# Patient Record
Sex: FEMALE | Race: WHITE | NOT HISPANIC OR LATINO | Employment: UNEMPLOYED | URBAN - METROPOLITAN AREA
[De-identification: names, ages, dates, MRNs, and addresses within clinical notes are randomized per-mention and may not be internally consistent; named-entity substitution may affect disease eponyms.]

---

## 2017-01-10 ENCOUNTER — TRANSCRIBE ORDERS (OUTPATIENT)
Dept: ADMINISTRATIVE | Facility: HOSPITAL | Age: 32
End: 2017-01-10

## 2017-01-10 ENCOUNTER — LAB (OUTPATIENT)
Dept: LAB | Facility: HOSPITAL | Age: 32
End: 2017-01-10

## 2017-01-10 DIAGNOSIS — Z00.00 ROUTINE GENERAL MEDICAL EXAMINATION AT A HEALTH CARE FACILITY: Primary | ICD-10-CM

## 2017-01-10 DIAGNOSIS — Z00.00 ROUTINE GENERAL MEDICAL EXAMINATION AT A HEALTH CARE FACILITY: ICD-10-CM

## 2017-01-10 LAB
ALBUMIN SERPL-MCNC: 4.1 G/DL (ref 3.5–5.2)
ALBUMIN/GLOB SERPL: 1.4 G/DL
ALP SERPL-CCNC: 30 U/L (ref 39–117)
ALT SERPL W P-5'-P-CCNC: 11 U/L (ref 1–33)
ANION GAP SERPL CALCULATED.3IONS-SCNC: 12.1 MMOL/L
AST SERPL-CCNC: 15 U/L (ref 1–32)
BASOPHILS # BLD AUTO: 0.04 10*3/MM3 (ref 0–0.2)
BASOPHILS NFR BLD AUTO: 0.5 % (ref 0–1.5)
BILIRUB SERPL-MCNC: 0.2 MG/DL (ref 0.1–1.2)
BUN BLD-MCNC: 15 MG/DL (ref 6–20)
BUN/CREAT SERPL: 25 (ref 7–25)
CALCIUM SPEC-SCNC: 9.5 MG/DL (ref 8.6–10.5)
CHLORIDE SERPL-SCNC: 101 MMOL/L (ref 98–107)
CO2 SERPL-SCNC: 24.9 MMOL/L (ref 22–29)
CREAT BLD-MCNC: 0.6 MG/DL (ref 0.57–1)
DEPRECATED RDW RBC AUTO: 40.9 FL (ref 37–54)
EOSINOPHIL # BLD AUTO: 0.18 10*3/MM3 (ref 0–0.7)
EOSINOPHIL NFR BLD AUTO: 2.2 % (ref 0.3–6.2)
ERYTHROCYTE [DISTWIDTH] IN BLOOD BY AUTOMATED COUNT: 11.9 % (ref 11.7–13)
FERRITIN SERPL-MCNC: 163.1 NG/ML (ref 13–150)
FOLATE SERPL-MCNC: 14.91 NG/ML (ref 4.78–24.2)
GFR SERPL CREATININE-BSD FRML MDRD: 117 ML/MIN/1.73
GLOBULIN UR ELPH-MCNC: 2.9 GM/DL
GLUCOSE BLD-MCNC: 85 MG/DL (ref 65–99)
HCT VFR BLD AUTO: 40.6 % (ref 35.6–45.5)
HGB BLD-MCNC: 13.2 G/DL (ref 11.9–15.5)
IMM GRANULOCYTES # BLD: 0.02 10*3/MM3 (ref 0–0.03)
IMM GRANULOCYTES NFR BLD: 0.2 % (ref 0–0.5)
IRON 24H UR-MRATE: 50 MCG/DL (ref 37–145)
LYMPHOCYTES # BLD AUTO: 3.11 10*3/MM3 (ref 0.9–4.8)
LYMPHOCYTES NFR BLD AUTO: 37.8 % (ref 19.6–45.3)
MCH RBC QN AUTO: 30.9 PG (ref 26.9–32)
MCHC RBC AUTO-ENTMCNC: 32.5 G/DL (ref 32.4–36.3)
MCV RBC AUTO: 95.1 FL (ref 80.5–98.2)
MONOCYTES # BLD AUTO: 0.37 10*3/MM3 (ref 0.2–1.2)
MONOCYTES NFR BLD AUTO: 4.5 % (ref 5–12)
NEUTROPHILS # BLD AUTO: 4.5 10*3/MM3 (ref 1.9–8.1)
NEUTROPHILS NFR BLD AUTO: 54.8 % (ref 42.7–76)
NRBC BLD MANUAL-RTO: 0 /100 WBC (ref 0–0)
PLATELET # BLD AUTO: 288 10*3/MM3 (ref 140–500)
PMV BLD AUTO: 10.5 FL (ref 6–12)
POTASSIUM BLD-SCNC: 3.9 MMOL/L (ref 3.5–5.2)
PROT SERPL-MCNC: 7 G/DL (ref 6–8.5)
RBC # BLD AUTO: 4.27 10*6/MM3 (ref 3.9–5.2)
SODIUM BLD-SCNC: 138 MMOL/L (ref 136–145)
T4 FREE SERPL-MCNC: 1.06 NG/DL (ref 0.93–1.7)
T4 SERPL-MCNC: 7.69 MCG/DL (ref 4.5–11.7)
TSH SERPL DL<=0.05 MIU/L-ACNC: 2.58 MIU/ML (ref 0.27–4.2)
VIT B12 BLD-MCNC: 876 PG/ML (ref 211–946)
WBC NRBC COR # BLD: 8.22 10*3/MM3 (ref 4.5–10.7)

## 2017-01-10 PROCEDURE — 82728 ASSAY OF FERRITIN: CPT | Performed by: INTERNAL MEDICINE

## 2017-01-10 PROCEDURE — 82746 ASSAY OF FOLIC ACID SERUM: CPT | Performed by: INTERNAL MEDICINE

## 2017-01-10 PROCEDURE — 82607 VITAMIN B-12: CPT | Performed by: INTERNAL MEDICINE

## 2017-01-10 PROCEDURE — 84436 ASSAY OF TOTAL THYROXINE: CPT | Performed by: INTERNAL MEDICINE

## 2017-01-10 PROCEDURE — 83540 ASSAY OF IRON: CPT | Performed by: INTERNAL MEDICINE

## 2017-01-10 PROCEDURE — 36415 COLL VENOUS BLD VENIPUNCTURE: CPT

## 2017-01-10 PROCEDURE — 84443 ASSAY THYROID STIM HORMONE: CPT | Performed by: INTERNAL MEDICINE

## 2017-01-10 PROCEDURE — 80053 COMPREHEN METABOLIC PANEL: CPT | Performed by: INTERNAL MEDICINE

## 2017-01-10 PROCEDURE — 85025 COMPLETE CBC W/AUTO DIFF WBC: CPT | Performed by: INTERNAL MEDICINE

## 2017-01-10 PROCEDURE — 84439 ASSAY OF FREE THYROXINE: CPT | Performed by: INTERNAL MEDICINE

## 2018-04-27 LAB
EXTERNAL ABO GROUPING: NORMAL
EXTERNAL ANTIBODY SCREEN: NEGATIVE
EXTERNAL GROUP B STREP ANTIGEN: NORMAL
EXTERNAL HEPATITIS B SURFACE ANTIGEN: NEGATIVE
EXTERNAL RH FACTOR: POSITIVE
EXTERNAL RUBELLA QUALITATIVE: NORMAL
EXTERNAL SYPHILIS RPR SCREEN: NORMAL
HIV1 P24 AG SERPL QL IA: NEGATIVE

## 2018-12-08 ENCOUNTER — HOSPITAL ENCOUNTER (INPATIENT)
Facility: HOSPITAL | Age: 33
LOS: 5 days | Discharge: HOME | End: 2018-12-13
Attending: OBSTETRICS & GYNECOLOGY | Admitting: OBSTETRICS & GYNECOLOGY

## 2018-12-08 ENCOUNTER — ANESTHESIA EVENT (OUTPATIENT)
Dept: LABOR AND DELIVERY | Facility: HOSPITAL | Age: 33
End: 2018-12-08

## 2018-12-08 ENCOUNTER — ANESTHESIA (OUTPATIENT)
Dept: LABOR AND DELIVERY | Facility: HOSPITAL | Age: 33
End: 2018-12-08

## 2018-12-08 ENCOUNTER — HOSPITAL ENCOUNTER (OUTPATIENT)
Dept: LABOR AND DELIVERY | Facility: HOSPITAL | Age: 33
Discharge: HOME | End: 2018-12-08

## 2018-12-08 PROBLEM — Z34.90 PREGNANCY: Status: ACTIVE | Noted: 2018-12-08

## 2018-12-08 LAB
ABO GROUP BLD: NORMAL
BASOPHILS # BLD AUTO: 0.03 10*3/MM3 (ref 0–0.2)
BASOPHILS NFR BLD AUTO: 0.3 % (ref 0–1.5)
BLD GP AB SCN SERPL QL: NEGATIVE
DEPRECATED RDW RBC AUTO: 45 FL (ref 37–54)
EOSINOPHIL # BLD AUTO: 0.25 10*3/MM3 (ref 0–0.7)
EOSINOPHIL NFR BLD AUTO: 2.1 % (ref 0.3–6.2)
ERYTHROCYTE [DISTWIDTH] IN BLOOD BY AUTOMATED COUNT: 12.5 % (ref 11.7–13)
EXPIRATION DATE: NORMAL
HCT VFR BLD AUTO: 43.2 % (ref 35.6–45.5)
HGB BLD-MCNC: 13.8 G/DL (ref 11.9–15.5)
IMM GRANULOCYTES # BLD: 0.04 10*3/MM3 (ref 0–0.03)
IMM GRANULOCYTES NFR BLD: 0.3 % (ref 0–0.5)
LYMPHOCYTES # BLD AUTO: 2.39 10*3/MM3 (ref 0.9–4.8)
LYMPHOCYTES NFR BLD AUTO: 20 % (ref 19.6–45.3)
Lab: NORMAL
MCH RBC QN AUTO: 31.5 PG (ref 26.9–32)
MCHC RBC AUTO-ENTMCNC: 31.9 G/DL (ref 32.4–36.3)
MCV RBC AUTO: 98.6 FL (ref 80.5–98.2)
MONOCYTES # BLD AUTO: 0.78 10*3/MM3 (ref 0.2–1.2)
MONOCYTES NFR BLD AUTO: 6.5 % (ref 5–12)
NEUTROPHILS # BLD AUTO: 8.46 10*3/MM3 (ref 1.9–8.1)
NEUTROPHILS NFR BLD AUTO: 70.8 % (ref 42.7–76)
PLATELET # BLD AUTO: 228 10*3/MM3 (ref 140–500)
PMV BLD AUTO: 11.1 FL (ref 6–12)
PROT UR STRIP-MCNC: NEGATIVE MG/DL
RBC # BLD AUTO: 4.38 10*6/MM3 (ref 3.9–5.2)
RH BLD: POSITIVE
T&S EXPIRATION DATE: NORMAL
WBC NRBC COR # BLD: 11.95 10*3/MM3 (ref 4.5–10.7)

## 2018-12-08 PROCEDURE — 86901 BLOOD TYPING SEROLOGIC RH(D): CPT | Performed by: OBSTETRICS & GYNECOLOGY

## 2018-12-08 PROCEDURE — 25010000002 ONDANSETRON PER 1 MG: Performed by: OBSTETRICS & GYNECOLOGY

## 2018-12-08 PROCEDURE — 86850 RBC ANTIBODY SCREEN: CPT | Performed by: OBSTETRICS & GYNECOLOGY

## 2018-12-08 PROCEDURE — C1755 CATHETER, INTRASPINAL: HCPCS | Performed by: ANESTHESIOLOGY

## 2018-12-08 PROCEDURE — 85025 COMPLETE CBC W/AUTO DIFF WBC: CPT | Performed by: OBSTETRICS & GYNECOLOGY

## 2018-12-08 PROCEDURE — 86900 BLOOD TYPING SEROLOGIC ABO: CPT | Performed by: OBSTETRICS & GYNECOLOGY

## 2018-12-08 PROCEDURE — 3E033VJ INTRODUCTION OF OTHER HORMONE INTO PERIPHERAL VEIN, PERCUTANEOUS APPROACH: ICD-10-PCS | Performed by: OBSTETRICS & GYNECOLOGY

## 2018-12-08 PROCEDURE — 81002 URINALYSIS NONAUTO W/O SCOPE: CPT | Performed by: OBSTETRICS & GYNECOLOGY

## 2018-12-08 RX ORDER — SODIUM CHLORIDE 0.9 % (FLUSH) 0.9 %
3-10 SYRINGE (ML) INJECTION AS NEEDED
Status: DISCONTINUED | OUTPATIENT
Start: 2018-12-08 | End: 2018-12-09 | Stop reason: HOSPADM

## 2018-12-08 RX ORDER — OXYTOCIN-SODIUM CHLORIDE 0.9% IV SOLN 30 UNIT/500ML 30-0.9/5 UT/ML-%
2 SOLUTION INTRAVENOUS
Status: DISCONTINUED | OUTPATIENT
Start: 2018-12-08 | End: 2018-12-09 | Stop reason: HOSPADM

## 2018-12-08 RX ORDER — MINERAL OIL
OIL (ML) MISCELLANEOUS ONCE
Status: DISCONTINUED | OUTPATIENT
Start: 2018-12-08 | End: 2018-12-09 | Stop reason: HOSPADM

## 2018-12-08 RX ORDER — SODIUM CHLORIDE 0.9 % (FLUSH) 0.9 %
3 SYRINGE (ML) INJECTION EVERY 12 HOURS SCHEDULED
Status: DISCONTINUED | OUTPATIENT
Start: 2018-12-08 | End: 2018-12-09 | Stop reason: HOSPADM

## 2018-12-08 RX ORDER — EPHEDRINE SULFATE 50 MG/ML
5 INJECTION, SOLUTION INTRAVENOUS
Status: DISCONTINUED | OUTPATIENT
Start: 2018-12-08 | End: 2018-12-09 | Stop reason: HOSPADM

## 2018-12-08 RX ORDER — ROPIVACAINE HYDROCHLORIDE 2 MG/ML
10 INJECTION, SOLUTION EPIDURAL; INFILTRATION; PERINEURAL CONTINUOUS
Status: DISCONTINUED | OUTPATIENT
Start: 2018-12-08 | End: 2018-12-10

## 2018-12-08 RX ORDER — FAMOTIDINE 10 MG/ML
20 INJECTION, SOLUTION INTRAVENOUS EVERY 12 HOURS SCHEDULED
Status: DISCONTINUED | OUTPATIENT
Start: 2018-12-08 | End: 2018-12-09 | Stop reason: HOSPADM

## 2018-12-08 RX ORDER — ONDANSETRON 2 MG/ML
4 INJECTION INTRAMUSCULAR; INTRAVENOUS EVERY 6 HOURS PRN
Status: DISCONTINUED | OUTPATIENT
Start: 2018-12-08 | End: 2018-12-09 | Stop reason: HOSPADM

## 2018-12-08 RX ORDER — BUTORPHANOL TARTRATE 1 MG/ML
1 INJECTION, SOLUTION INTRAMUSCULAR; INTRAVENOUS
Status: DISCONTINUED | OUTPATIENT
Start: 2018-12-08 | End: 2018-12-09 | Stop reason: HOSPADM

## 2018-12-08 RX ORDER — DEXTROSE, SODIUM CHLORIDE, SODIUM LACTATE, POTASSIUM CHLORIDE, AND CALCIUM CHLORIDE 5; .6; .31; .03; .02 G/100ML; G/100ML; G/100ML; G/100ML; G/100ML
125 INJECTION, SOLUTION INTRAVENOUS CONTINUOUS
Status: DISCONTINUED | OUTPATIENT
Start: 2018-12-08 | End: 2018-12-10

## 2018-12-08 RX ORDER — ONDANSETRON 4 MG/1
4 TABLET, FILM COATED ORAL EVERY 6 HOURS PRN
Status: DISCONTINUED | OUTPATIENT
Start: 2018-12-08 | End: 2018-12-09 | Stop reason: HOSPADM

## 2018-12-08 RX ORDER — FAMOTIDINE 20 MG/1
20 TABLET, FILM COATED ORAL EVERY 12 HOURS SCHEDULED
Status: DISCONTINUED | OUTPATIENT
Start: 2018-12-08 | End: 2018-12-09 | Stop reason: HOSPADM

## 2018-12-08 RX ORDER — LIDOCAINE HYDROCHLORIDE AND EPINEPHRINE 15; 5 MG/ML; UG/ML
INJECTION, SOLUTION EPIDURAL AS NEEDED
Status: DISCONTINUED | OUTPATIENT
Start: 2018-12-08 | End: 2018-12-09 | Stop reason: SURG

## 2018-12-08 RX ORDER — LIDOCAINE HYDROCHLORIDE 10 MG/ML
5 INJECTION, SOLUTION EPIDURAL; INFILTRATION; INTRACAUDAL; PERINEURAL AS NEEDED
Status: DISCONTINUED | OUTPATIENT
Start: 2018-12-08 | End: 2018-12-09 | Stop reason: HOSPADM

## 2018-12-08 RX ORDER — LIDOCAINE HYDROCHLORIDE 10 MG/ML
INJECTION, SOLUTION INFILTRATION; PERINEURAL AS NEEDED
Status: DISCONTINUED | OUTPATIENT
Start: 2018-12-08 | End: 2018-12-09 | Stop reason: SURG

## 2018-12-08 RX ORDER — ONDANSETRON 4 MG/1
4 TABLET, ORALLY DISINTEGRATING ORAL EVERY 6 HOURS PRN
Status: DISCONTINUED | OUTPATIENT
Start: 2018-12-08 | End: 2018-12-09 | Stop reason: HOSPADM

## 2018-12-08 RX ORDER — SODIUM CHLORIDE, SODIUM LACTATE, POTASSIUM CHLORIDE, CALCIUM CHLORIDE 600; 310; 30; 20 MG/100ML; MG/100ML; MG/100ML; MG/100ML
125 INJECTION, SOLUTION INTRAVENOUS CONTINUOUS
Status: DISCONTINUED | OUTPATIENT
Start: 2018-12-08 | End: 2018-12-10

## 2018-12-08 RX ORDER — PRENATAL VIT NO.126/IRON/FOLIC 28MG-0.8MG
1 TABLET ORAL DAILY
COMMUNITY

## 2018-12-08 RX ORDER — ACETAMINOPHEN 325 MG/1
650 TABLET ORAL EVERY 4 HOURS PRN
Status: DISCONTINUED | OUTPATIENT
Start: 2018-12-08 | End: 2018-12-09 | Stop reason: HOSPADM

## 2018-12-08 RX ORDER — TERBUTALINE SULFATE 1 MG/ML
0.25 INJECTION, SOLUTION SUBCUTANEOUS AS NEEDED
Status: DISCONTINUED | OUTPATIENT
Start: 2018-12-08 | End: 2018-12-09 | Stop reason: HOSPADM

## 2018-12-08 RX ADMIN — ONDANSETRON 4 MG: 2 INJECTION INTRAMUSCULAR; INTRAVENOUS at 22:19

## 2018-12-08 RX ADMIN — OXYTOCIN 2 MILLI-UNITS/MIN: 10 INJECTION, SOLUTION INTRAMUSCULAR; INTRAVENOUS at 13:39

## 2018-12-08 RX ADMIN — SODIUM CHLORIDE, POTASSIUM CHLORIDE, SODIUM LACTATE AND CALCIUM CHLORIDE 125 ML/HR: 600; 310; 30; 20 INJECTION, SOLUTION INTRAVENOUS at 20:29

## 2018-12-08 RX ADMIN — SODIUM CHLORIDE, POTASSIUM CHLORIDE, SODIUM LACTATE AND CALCIUM CHLORIDE 125 ML/HR: 600; 310; 30; 20 INJECTION, SOLUTION INTRAVENOUS at 18:44

## 2018-12-08 RX ADMIN — LIDOCAINE HYDROCHLORIDE 5 ML: 10 INJECTION, SOLUTION INFILTRATION; PERINEURAL at 19:12

## 2018-12-08 RX ADMIN — SODIUM CHLORIDE, SODIUM LACTATE, POTASSIUM CHLORIDE, CALCIUM CHLORIDE AND DEXTROSE MONOHYDRATE 125 ML/HR: 5; 600; 310; 30; 20 INJECTION, SOLUTION INTRAVENOUS at 22:48

## 2018-12-08 RX ADMIN — SODIUM CHLORIDE, POTASSIUM CHLORIDE, SODIUM LACTATE AND CALCIUM CHLORIDE 125 ML/HR: 600; 310; 30; 20 INJECTION, SOLUTION INTRAVENOUS at 08:04

## 2018-12-08 RX ADMIN — Medication 10 ML/HR: at 19:16

## 2018-12-08 RX ADMIN — LIDOCAINE HYDROCHLORIDE AND EPINEPHRINE 3 ML: 15; 5 INJECTION, SOLUTION EPIDURAL at 19:08

## 2018-12-08 NOTE — PLAN OF CARE
Problem: Patient Care Overview  Goal: Individualization and Mutuality  Outcome: Ongoing (interventions implemented as appropriate)   12/08/18 1500   Individualization   Patient Specific Preferences  to cut cord,tolerate labor without need to integrate pain meds or epidural if possible.    Patient Specific Goals (Include Timeframe) healthy baby, successful with breasfeeding   Patient Specific Interventions up out of bd every hour then if epidural frequent position changes.

## 2018-12-08 NOTE — PLAN OF CARE
Problem: Patient Care Overview  Goal: Plan of Care Review  Outcome: Ongoing (interventions implemented as appropriate)   12/08/18 1824   Coping/Psychosocial   Plan of Care Reviewed With patient;spouse   Plan of Care Review   Progress improving   OTHER   Outcome Summary pt progressing toward goals, good family support @ bedside. pt hopeful she will have made cx changes with next sve.      Goal: Individualization and Mutuality  Outcome: Ongoing (interventions implemented as appropriate)      Problem: Labor (Cervical Ripen, Induct, Augment) (Adult,Obstetrics,Pediatric)  Goal: Signs and Symptoms of Listed Potential Problems Will be Absent, Minimized or Managed (Labor)  Outcome: Ongoing (interventions implemented as appropriate)   12/08/18 1824   Goal/Outcome Evaluation   Problems Assessed (Labor) all   Problems Present (Labor) pain  (pt denies needing interventions at this time)

## 2018-12-08 NOTE — ANESTHESIA PREPROCEDURE EVALUATION
Anesthesia Evaluation     Patient summary reviewed   no history of anesthetic complications:               Airway   Mallampati: II  No difficulty expected  Dental      Pulmonary    Cardiovascular     Rhythm: regular        Neuro/Psych  GI/Hepatic/Renal/Endo      Musculoskeletal     Abdominal    Substance History      OB/GYN    (+) Pregnant,         Other                      Anesthesia Plan    ASA 2     epidural     Anesthetic plan, all risks, benefits, and alternatives have been provided, discussed and informed consent has been obtained with: patient and spouse/significant other.

## 2018-12-08 NOTE — NURSING NOTE
Dr Tillman called per RN and updated on pt arrival, desire for NCB with AROM before pitocin. MD stated pt could ambulate 40min off and be monitored 20min per hour. POC discussed with pt and all questions answered. Pt to ambulate at this time

## 2018-12-09 LAB
ATMOSPHERIC PRESS: 755.7 MMHG
BASE EXCESS BLDCOA CALC-SCNC: -2.3 MMOL/L
BDY SITE: ABNORMAL
GAS FLOW AIRWAY: 2 LPM
HCO3 BLDCOA-SCNC: 25.5 MMOL/L (ref 22–28)
MODALITY: ABNORMAL
NOTE: ABNORMAL
PCO2 BLDCOA: 53.7 MMHG
PH BLDCOA: 7.29 PH UNITS (ref 7.18–7.34)
PO2 BLDCOA: <10.9 MMHG (ref 12–26)
SAO2 % BLDCOA: 7.1 % (ref 92–99)

## 2018-12-09 PROCEDURE — 25010000003 CEFAZOLIN IN DEXTROSE 2-4 GM/100ML-% SOLUTION: Performed by: OBSTETRICS & GYNECOLOGY

## 2018-12-09 PROCEDURE — 25010000002 ONDANSETRON PER 1 MG: Performed by: ANESTHESIOLOGY

## 2018-12-09 PROCEDURE — 25010000002 MORPHINE PER 10 MG: Performed by: ANESTHESIOLOGY

## 2018-12-09 PROCEDURE — 82803 BLOOD GASES ANY COMBINATION: CPT

## 2018-12-09 PROCEDURE — 25010000002 PHENYLEPHRINE PER 1 ML: Performed by: ANESTHESIOLOGY

## 2018-12-09 RX ORDER — OXYTOCIN-SODIUM CHLORIDE 0.9% IV SOLN 30 UNIT/500ML 30-0.9/5 UT/ML-%
999 SOLUTION INTRAVENOUS ONCE
Status: COMPLETED | OUTPATIENT
Start: 2018-12-09 | End: 2018-12-09

## 2018-12-09 RX ORDER — OXYTOCIN-SODIUM CHLORIDE 0.9% IV SOLN 30 UNIT/500ML 30-0.9/5 UT/ML-%
125 SOLUTION INTRAVENOUS CONTINUOUS PRN
Status: COMPLETED | OUTPATIENT
Start: 2018-12-09 | End: 2018-12-09

## 2018-12-09 RX ORDER — IBUPROFEN 600 MG/1
600 TABLET ORAL EVERY 8 HOURS PRN
Status: DISCONTINUED | OUTPATIENT
Start: 2018-12-09 | End: 2018-12-13 | Stop reason: HOSPADM

## 2018-12-09 RX ORDER — MORPHINE SULFATE 2 MG/ML
2 INJECTION, SOLUTION INTRAMUSCULAR; INTRAVENOUS
Status: ACTIVE | OUTPATIENT
Start: 2018-12-09 | End: 2018-12-10

## 2018-12-09 RX ORDER — ONDANSETRON 2 MG/ML
INJECTION INTRAMUSCULAR; INTRAVENOUS AS NEEDED
Status: DISCONTINUED | OUTPATIENT
Start: 2018-12-09 | End: 2018-12-09 | Stop reason: SURG

## 2018-12-09 RX ORDER — OXYTOCIN-SODIUM CHLORIDE 0.9% IV SOLN 30 UNIT/500ML 30-0.9/5 UT/ML-%
250 SOLUTION INTRAVENOUS CONTINUOUS
Status: DISPENSED | OUTPATIENT
Start: 2018-12-09 | End: 2018-12-09

## 2018-12-09 RX ORDER — ONDANSETRON 2 MG/ML
4 INJECTION INTRAMUSCULAR; INTRAVENOUS ONCE AS NEEDED
Status: DISCONTINUED | OUTPATIENT
Start: 2018-12-09 | End: 2018-12-13 | Stop reason: HOSPADM

## 2018-12-09 RX ORDER — SIMETHICONE 80 MG
80 TABLET,CHEWABLE ORAL 4 TIMES DAILY PRN
Status: DISCONTINUED | OUTPATIENT
Start: 2018-12-09 | End: 2018-12-13 | Stop reason: HOSPADM

## 2018-12-09 RX ORDER — METHYLERGONOVINE MALEATE 0.2 MG/ML
200 INJECTION INTRAVENOUS ONCE AS NEEDED
Status: DISCONTINUED | OUTPATIENT
Start: 2018-12-09 | End: 2018-12-09 | Stop reason: HOSPADM

## 2018-12-09 RX ORDER — MORPHINE SULFATE 1 MG/ML
INJECTION, SOLUTION EPIDURAL; INTRATHECAL; INTRAVENOUS
Status: COMPLETED
Start: 2018-12-09 | End: 2018-12-09

## 2018-12-09 RX ORDER — ONDANSETRON 4 MG/1
4 TABLET, FILM COATED ORAL EVERY 8 HOURS PRN
Status: DISCONTINUED | OUTPATIENT
Start: 2018-12-09 | End: 2018-12-13 | Stop reason: HOSPADM

## 2018-12-09 RX ORDER — ERYTHROMYCIN 5 MG/G
OINTMENT OPHTHALMIC
Status: DISPENSED
Start: 2018-12-09 | End: 2018-12-09

## 2018-12-09 RX ORDER — DOCUSATE SODIUM 100 MG/1
100 CAPSULE, LIQUID FILLED ORAL 2 TIMES DAILY PRN
Status: DISCONTINUED | OUTPATIENT
Start: 2018-12-09 | End: 2018-12-13 | Stop reason: HOSPADM

## 2018-12-09 RX ORDER — CARBOPROST TROMETHAMINE 250 UG/ML
250 INJECTION, SOLUTION INTRAMUSCULAR AS NEEDED
Status: DISCONTINUED | OUTPATIENT
Start: 2018-12-09 | End: 2018-12-09 | Stop reason: HOSPADM

## 2018-12-09 RX ORDER — OXYCODONE HYDROCHLORIDE AND ACETAMINOPHEN 5; 325 MG/1; MG/1
1 TABLET ORAL EVERY 4 HOURS PRN
Status: DISCONTINUED | OUTPATIENT
Start: 2018-12-09 | End: 2018-12-13 | Stop reason: HOSPADM

## 2018-12-09 RX ORDER — PHYTONADIONE 1 MG/.5ML
INJECTION, EMULSION INTRAMUSCULAR; INTRAVENOUS; SUBCUTANEOUS
Status: DISPENSED
Start: 2018-12-09 | End: 2018-12-09

## 2018-12-09 RX ORDER — SODIUM CHLORIDE 9 MG/ML
100 INJECTION, SOLUTION INTRAVENOUS CONTINUOUS
Status: DISCONTINUED | OUTPATIENT
Start: 2018-12-09 | End: 2018-12-10

## 2018-12-09 RX ORDER — LIDOCAINE HYDROCHLORIDE AND EPINEPHRINE 20; 5 MG/ML; UG/ML
INJECTION, SOLUTION EPIDURAL; INFILTRATION; INTRACAUDAL; PERINEURAL AS NEEDED
Status: DISCONTINUED | OUTPATIENT
Start: 2018-12-09 | End: 2018-12-09 | Stop reason: SURG

## 2018-12-09 RX ORDER — ACETAMINOPHEN 325 MG/1
650 TABLET ORAL EVERY 4 HOURS PRN
Status: DISCONTINUED | OUTPATIENT
Start: 2018-12-09 | End: 2018-12-13 | Stop reason: HOSPADM

## 2018-12-09 RX ORDER — OXYCODONE AND ACETAMINOPHEN 7.5; 325 MG/1; MG/1
1 TABLET ORAL EVERY 4 HOURS PRN
Status: DISCONTINUED | OUTPATIENT
Start: 2018-12-09 | End: 2018-12-13 | Stop reason: HOSPADM

## 2018-12-09 RX ORDER — DIPHENHYDRAMINE HYDROCHLORIDE 50 MG/ML
25 INJECTION INTRAMUSCULAR; INTRAVENOUS EVERY 4 HOURS PRN
Status: DISCONTINUED | OUTPATIENT
Start: 2018-12-09 | End: 2018-12-13 | Stop reason: HOSPADM

## 2018-12-09 RX ORDER — CEFAZOLIN SODIUM 2 G/100ML
2 INJECTION, SOLUTION INTRAVENOUS ONCE
Status: COMPLETED | OUTPATIENT
Start: 2018-12-09 | End: 2018-12-09

## 2018-12-09 RX ORDER — MISOPROSTOL 200 UG/1
800 TABLET ORAL AS NEEDED
Status: DISCONTINUED | OUTPATIENT
Start: 2018-12-09 | End: 2018-12-09 | Stop reason: HOSPADM

## 2018-12-09 RX ORDER — DIPHENHYDRAMINE HCL 25 MG
25 CAPSULE ORAL EVERY 4 HOURS PRN
Status: DISCONTINUED | OUTPATIENT
Start: 2018-12-09 | End: 2018-12-13 | Stop reason: HOSPADM

## 2018-12-09 RX ORDER — HYDROMORPHONE HYDROCHLORIDE 1 MG/ML
0.5 INJECTION, SOLUTION INTRAMUSCULAR; INTRAVENOUS; SUBCUTANEOUS
Status: ACTIVE | OUTPATIENT
Start: 2018-12-09 | End: 2018-12-10

## 2018-12-09 RX ORDER — MORPHINE SULFATE 1 MG/ML
INJECTION, SOLUTION EPIDURAL; INTRATHECAL; INTRAVENOUS AS NEEDED
Status: DISCONTINUED | OUTPATIENT
Start: 2018-12-09 | End: 2018-12-09 | Stop reason: SURG

## 2018-12-09 RX ADMIN — LIDOCAINE HYDROCHLORIDE,EPINEPHRINE BITARTRATE 4 ML: 20; .005 INJECTION, SOLUTION EPIDURAL; INFILTRATION; INTRACAUDAL; PERINEURAL at 03:52

## 2018-12-09 RX ADMIN — OXYTOCIN 125 ML/HR: 10 INJECTION, SOLUTION INTRAMUSCULAR; INTRAVENOUS at 05:56

## 2018-12-09 RX ADMIN — SIMETHICONE CHEW TAB 80 MG 80 MG: 80 TABLET ORAL at 14:52

## 2018-12-09 RX ADMIN — PHENYLEPHRINE HYDROCHLORIDE 100 MCG: 10 INJECTION INTRAVENOUS at 04:25

## 2018-12-09 RX ADMIN — ONDANSETRON 4 MG: 2 INJECTION INTRAMUSCULAR; INTRAVENOUS at 04:15

## 2018-12-09 RX ADMIN — AZITHROMYCIN DIHYDRATE 500 MG: 500 INJECTION, POWDER, LYOPHILIZED, FOR SOLUTION INTRAVENOUS at 03:45

## 2018-12-09 RX ADMIN — LIDOCAINE HYDROCHLORIDE,EPINEPHRINE BITARTRATE 4 ML: 20; .005 INJECTION, SOLUTION EPIDURAL; INFILTRATION; INTRACAUDAL; PERINEURAL at 03:54

## 2018-12-09 RX ADMIN — MORPHINE SULFATE 3 MG: 1 INJECTION EPIDURAL; INTRATHECAL; INTRAVENOUS at 04:35

## 2018-12-09 RX ADMIN — OXYTOCIN 999 ML/HR: 10 INJECTION INTRAVENOUS at 04:14

## 2018-12-09 RX ADMIN — SODIUM CHLORIDE, SODIUM LACTATE, POTASSIUM CHLORIDE, CALCIUM CHLORIDE AND DEXTROSE MONOHYDRATE: 5; 600; 310; 30; 20 INJECTION, SOLUTION INTRAVENOUS at 03:45

## 2018-12-09 RX ADMIN — LIDOCAINE HYDROCHLORIDE,EPINEPHRINE BITARTRATE 4 ML: 20; .005 INJECTION, SOLUTION EPIDURAL; INFILTRATION; INTRACAUDAL; PERINEURAL at 03:49

## 2018-12-09 RX ADMIN — MORPHINE SULFATE 2 MG: 1 INJECTION EPIDURAL; INTRATHECAL; INTRAVENOUS at 04:43

## 2018-12-09 RX ADMIN — LIDOCAINE HYDROCHLORIDE,EPINEPHRINE BITARTRATE 4 ML: 20; .005 INJECTION, SOLUTION EPIDURAL; INFILTRATION; INTRACAUDAL; PERINEURAL at 04:25

## 2018-12-09 RX ADMIN — OXYCODONE AND ACETAMINOPHEN 1 TABLET: 5; 325 TABLET ORAL at 14:52

## 2018-12-09 RX ADMIN — PHENYLEPHRINE HYDROCHLORIDE 100 MCG: 10 INJECTION INTRAVENOUS at 04:29

## 2018-12-09 RX ADMIN — IBUPROFEN 600 MG: 600 TABLET ORAL at 14:52

## 2018-12-09 RX ADMIN — DOCUSATE SODIUM 100 MG: 100 CAPSULE, LIQUID FILLED ORAL at 14:52

## 2018-12-09 RX ADMIN — SODIUM CHLORIDE 300 ML: 9 INJECTION, SOLUTION INTRAVENOUS at 00:14

## 2018-12-09 RX ADMIN — PHENYLEPHRINE HYDROCHLORIDE 100 MCG: 10 INJECTION INTRAVENOUS at 04:02

## 2018-12-09 RX ADMIN — CEFAZOLIN SODIUM 2 G: 2 INJECTION, SOLUTION INTRAVENOUS at 03:45

## 2018-12-09 NOTE — ANESTHESIA PROCEDURE NOTES
Labor Epidural      Patient reassessed immediately prior to procedure    Patient location during procedure: OB  Performed By  Anesthesiologist: Elias Dolan MD  Preanesthetic Checklist  Completed: patient identified, site marked, surgical consent, pre-op evaluation, timeout performed, IV checked, risks and benefits discussed and monitors and equipment checked  Additional Notes  Test dose 3 cc 1.5% lidocaine with epi.  Second dose 5cc 1% lidocaine then continuous infusion o.2% Ropivicaine with 2 Mics fentanyl per cc at 10 cc hr, with  6cc PCA, 15 min lockout  Prep:  Pt Position:left lateral decubitus  Sterile Tech:gloves, cap, mask and sterile barrier  Prep:chlorhexidine gluconate and isopropyl alcohol  Monitoring:blood pressure monitoring, continuous pulse oximetry and EKG  Epidural Block Procedure:  Approach:midline  Guidance:landmark technique  Location:L4-L5  Needle Type:Tuohy  Needle Gauge:18  Loss of Resistance Medium: air  Paresthesia: none  Aspiration:negative  Test Dose:negative  Number of Attempts: 1  Post Assessment:  Dressing:occlusive dressing applied and secured with tape  Pt Tolerance:patient tolerated the procedure well with no apparent complications  Complications:no

## 2018-12-09 NOTE — H&P
[facility]  Obstetric Preop History and Physical:    Patient Name: Shelby SAM  :  1985  MRN:  [id number]      Chief Complaint   Patient presents with   • IOL     Scheduled IOL        Subjective                33 y.o. female  currently at 40w3d, who presented for IOl now with failure to descend.              Past OB History:       Obstetric History       T0      L0     SAB0   TAB0   Ectopic0   Molar0   Multiple0   Live Births0       # Outcome Date GA Lbr Del/2nd Weight Sex Delivery Anes PTL Lv   1 Current                   Past Medical History: History reviewed. No pertinent past medical history.   Past Surgical History Past Surgical History:   Procedure Laterality Date   • WISDOM TOOTH EXTRACTION        Family History: History reviewed. No pertinent family history.   Social History:  reports that  has never smoked. she has never used smokeless tobacco.   reports that she does not drink alcohol.   reports that she does not use drugs.    Allergies:     Patient has no known allergies.     Objective       Vital Signs Range for the last 24 hours  Temperature: Temp:  [98.2 °F (36.8 °C)-99.5 °F (37.5 °C)] 99.5 °F (37.5 °C)   Temp Source: Temp src: Oral   BP: BP: (105-150)/(59-82) 132/79   Pulse: Heart Rate:  [] 96   Respirations: Resp:  [16-18] 16   SPO2: SpO2:  [95 %] 95 %        dextrose 5 % and lactated Ringer's 125 mL/hr Last Rate: 125 mL/hr (18)   lactated ringers 125 mL/hr Last Rate: Stopped (18)   oxytocin 250 mL/hr    Followed by     oxytocin 125 mL/hr    oxytocin 2 lidia-units/min Last Rate: Stopped (182)   ropivacaine 10 mL/hr    sodium chloride 100 mL/hr    sufentanil (0.5 mcg/mL) and ropivacaine (0.2%) 10 mL/hr Last Rate: 10 mL/hr (18)                      Physical Exam:  General: Alert in NAD   Heart Normal rate and regular rhythm   Lungs Clear to auscultation bilaterally     Abdomen Gravid, soft, no masses   Extremities mod edema          Cervix: Exam by: Method: sterile exam per RN   Dilation: Cervical Dilation (cm): 10   Effacement: Cervical Effacement: 100%   Station: Fetal Station: +1                             FHR:   TOCO: 160's reactive  Regular contractions         Assessment/Plan     Assessment: IUP @ 40 3/7 with failure to descend.    Plan: Recommend proceeding with  section for delivery. Indications for proceeding, risks and benefits have been discussed with patient and . All questions answered.         Carol Tillman MD  2018  3:42 AM

## 2018-12-09 NOTE — ANESTHESIA POSTPROCEDURE EVALUATION
"Patient: Shelby SAM    Procedure Summary     Date:  18 Room / Location:   [facility]    Anesthesia Start:   Anesthesia Stop:  18    Procedure:   SECTION PRIMARY (Bilateral Abdomen) Diagnosis:  (arest of descent)    Surgeon:  Carol Tillman MD Provider:  Elias Dolan MD    Anesthesia Type:  epidural ASA Status:  2          Anesthesia Type: epidural  Last vitals  BP   126/67 (18)   Temp   36.9 °C (98.4 °F) (18)   Pulse   107 (18)   Resp   16 (18)     SpO2   94 % (18)     Post Anesthesia Care and Evaluation    Patient location during evaluation: PACU  Patient participation: complete - patient participated  Level of consciousness: awake and alert  Pain management: adequate  Airway patency: patent  Anesthetic complications: No anesthetic complications    Cardiovascular status: acceptable  Respiratory status: acceptable  Hydration status: acceptable    Comments: /67 (BP Location: Right arm, Patient Position: Lying)   Pulse 107   Temp 36.9 °C (98.4 °F)   Resp 16   Ht 160 cm (63\")   Wt 84.5 kg (186 lb 5 oz)   SpO2 94%   Breastfeeding? Unknown   BMI 33.00 kg/m²         "

## 2018-12-09 NOTE — LACTATION NOTE
P1. Baby Johnathan sleepy since birth and had not nursed . Patient has tried to latch him but even in WAI he sleeps and does not root. Used simeon pump to obtain colostrum and one cc was syringe fed to Johnathan.

## 2018-12-09 NOTE — PLAN OF CARE
Problem: Labor (Cervical Ripen, Induct, Augment) (Adult,Obstetrics,Pediatric)  Goal: Signs and Symptoms of Listed Potential Problems Will be Absent, Minimized or Managed (Labor)  Outcome: Outcome(s) achieved Date Met: 18   Goal/Outcome Evaluation   Problems Assessed (Labor) all   Problems Present (Labor) none       Problem: Fall Risk,  (Adult,Obstetrics,Pediatric)  Goal: Identify Related Risk Factors and Signs and Symptoms  Outcome: Ongoing (interventions implemented as appropriate)   18   Fall Risk,  (Adult,Obstetrics,Pediatric)   Related Risk Factors (Fall Risk, ) medication side effects   Signs and Symptoms (Fall Risk, ) presence of fall risk factors     Goal: Absence of Maternal Fall  Outcome: Ongoing (interventions implemented as appropriate)   18   Fall Risk,  (Adult,Obstetrics,Pediatric)   Absence of Maternal Fall making progress toward outcome     Goal: Absence of [place] Fall/Drop  Outcome: Ongoing (interventions implemented as appropriate)   18   Fall Risk,  (Adult,Obstetrics,Pediatric)   Absence of [place] Fall/Drop making progress toward outcome       Problem: Skin Injury Risk (Adult)  Goal: Identify Related Risk Factors and Signs and Symptoms  Outcome: Ongoing (interventions implemented as appropriate)   18   Skin Injury Risk (Adult)   Related Risk Factors (Skin Injury Risk) mobility impaired     Goal: Skin Health and Integrity  Outcome: Ongoing (interventions implemented as appropriate)   18   Skin Injury Risk (Adult)   Skin Health and Integrity making progress toward outcome       Problem: Anesthesia/Analgesia, Neuraxial (Adult)  Goal: Signs and Symptoms of Listed Potential Problems Will be Absent, Minimized or Managed (Anesthesia/Analgesia, Neuraxial)  Outcome: Ongoing (interventions implemented as appropriate)   18   Goal/Outcome Evaluation   Problems Assessed  (Neuraxial Anesthesia/Analgesia) all   Problems Present (Neuraxial Anes/Analg) none       Problem: Breastfeeding (Adult,Obstetrics,Pediatric)  Goal: Signs and Symptoms of Listed Potential Problems Will be Absent, Minimized or Managed (Breastfeeding)  Outcome: Ongoing (interventions implemented as appropriate)   12/09/18 2856   Goal/Outcome Evaluation   Problems Assessed (Breastfeeding) all   Problems Present (Breastfeeding) none

## 2018-12-10 LAB
BASOPHILS # BLD AUTO: 0.03 10*3/MM3 (ref 0–0.2)
BASOPHILS NFR BLD AUTO: 0.1 % (ref 0–1.5)
DEPRECATED RDW RBC AUTO: 47.5 FL (ref 37–54)
EOSINOPHIL # BLD AUTO: 0.36 10*3/MM3 (ref 0–0.7)
EOSINOPHIL NFR BLD AUTO: 1.6 % (ref 0.3–6.2)
ERYTHROCYTE [DISTWIDTH] IN BLOOD BY AUTOMATED COUNT: 12.9 % (ref 11.7–13)
HCT VFR BLD AUTO: 39.2 % (ref 35.6–45.5)
HGB BLD-MCNC: 12.4 G/DL (ref 11.9–15.5)
IMM GRANULOCYTES # BLD: 0.12 10*3/MM3 (ref 0–0.03)
IMM GRANULOCYTES NFR BLD: 0.5 % (ref 0–0.5)
LYMPHOCYTES # BLD AUTO: 3.06 10*3/MM3 (ref 0.9–4.8)
LYMPHOCYTES NFR BLD AUTO: 13.9 % (ref 19.6–45.3)
MCH RBC QN AUTO: 31.7 PG (ref 26.9–32)
MCHC RBC AUTO-ENTMCNC: 31.6 G/DL (ref 32.4–36.3)
MCV RBC AUTO: 100.3 FL (ref 80.5–98.2)
MONOCYTES # BLD AUTO: 1.21 10*3/MM3 (ref 0.2–1.2)
MONOCYTES NFR BLD AUTO: 5.5 % (ref 5–12)
NEUTROPHILS # BLD AUTO: 17.28 10*3/MM3 (ref 1.9–8.1)
NEUTROPHILS NFR BLD AUTO: 78.4 % (ref 42.7–76)
PLATELET # BLD AUTO: 184 10*3/MM3 (ref 140–500)
PMV BLD AUTO: 10.1 FL (ref 6–12)
RBC # BLD AUTO: 3.91 10*6/MM3 (ref 3.9–5.2)
WBC NRBC COR # BLD: 22.06 10*3/MM3 (ref 4.5–10.7)

## 2018-12-10 PROCEDURE — 85025 COMPLETE CBC W/AUTO DIFF WBC: CPT | Performed by: OBSTETRICS & GYNECOLOGY

## 2018-12-10 RX ORDER — NALOXONE HCL 0.4 MG/ML
0.2 VIAL (ML) INJECTION
Status: DISCONTINUED | OUTPATIENT
Start: 2018-12-10 | End: 2018-12-13 | Stop reason: HOSPADM

## 2018-12-10 RX ADMIN — IBUPROFEN 600 MG: 600 TABLET ORAL at 09:02

## 2018-12-10 RX ADMIN — OXYCODONE AND ACETAMINOPHEN 1 TABLET: 5; 325 TABLET ORAL at 23:10

## 2018-12-10 RX ADMIN — DOCUSATE SODIUM 100 MG: 100 CAPSULE, LIQUID FILLED ORAL at 23:10

## 2018-12-10 RX ADMIN — OXYCODONE AND ACETAMINOPHEN 1 TABLET: 5; 325 TABLET ORAL at 17:22

## 2018-12-10 RX ADMIN — SIMETHICONE CHEW TAB 80 MG 80 MG: 80 TABLET ORAL at 09:01

## 2018-12-10 RX ADMIN — IBUPROFEN 600 MG: 600 TABLET ORAL at 17:22

## 2018-12-10 RX ADMIN — DOCUSATE SODIUM 100 MG: 100 CAPSULE, LIQUID FILLED ORAL at 09:01

## 2018-12-10 RX ADMIN — OXYCODONE AND ACETAMINOPHEN 1 TABLET: 5; 325 TABLET ORAL at 09:02

## 2018-12-10 RX ADMIN — OXYCODONE AND ACETAMINOPHEN 1 TABLET: 5; 325 TABLET ORAL at 13:35

## 2018-12-10 RX ADMIN — SIMETHICONE CHEW TAB 80 MG 80 MG: 80 TABLET ORAL at 17:22

## 2018-12-10 NOTE — LACTATION NOTE
This note was copied from a baby's chart.  Assisted mom with latching baby to right breast. Baby was able to latch without nipple shield at this time. He is BF well.  Lactation Consult Note    Evaluation Completed: 12/10/2018 5:40 PM  Patient Name: Familia SAM  :  2017  MRN:  [id number]     REFERRAL  INFORMATION:                                         DELIVERY HISTORY:  This patient has no babies on file.  This patient has no babies on file.  Skin to skin initiation date/time: 2018 5:15 AM  Skin to skin end date/time: 2018 6:17 AM  This patient has no babies on file.    MATERNAL ASSESSMENT:                               INFANT ASSESSMENT:  This patient has no babies on file.  This patient has no babies on file.  This patient has no babies on file.  This patient has no babies on file.  This patient has no babies on file.  This patient has no babies on file.  This patient has no babies on file.  This patient has no babies on file.  This patient has no babies on file.  This patient has no babies on file.  This patient has no babies on file.  This patient has no babies on file.  This patient has no babies on file.  This patient has no babies on file.  This patient has no babies on file.  This patient has no babies on file.  This patient has no babies on file.  This patient has no babies on file.  This patient has no babies on file.  This patient has no babies on file.      This patient has no babies on file.  This patient has no babies on file.  This patient has no babies on file.  This patient has no babies on file.  This patient has no babies on file.  This patient has no babies on file.    This patient has no babies on file.  This patient has no babies on file.  This patient has no babies on file.        MATERNAL INFANT FEEDING:        Infant Positioning: clutch/football (12/10/18 0900 : Holly Marcus RN)   Signs of Milk Transfer: infant jaw motion present, audible swallow (12/10/18  0900 : Holly Marcus RN)                                                          EQUIPMENT TYPE:                                 BREAST PUMPING:          LACTATION REFERRALS:

## 2018-12-10 NOTE — PROGRESS NOTES
[facility]   PROGRESS NOTE    Patient Name: Shelby SAM  :  1985  MRN:  [id number]      Post-Op Day 1 S/P    Delivered a male infant.  Subjective     Patient reports:    Pain is well controlled. Voiding and ambulating without difficulty.    Tolerating po. Lochia normal.       The patient plans to breastfeed.         Objective       Vitals: Vital Signs Range for the last 24 hours  Temperature: Temp:  [97.1 °F (36.2 °C)-98.7 °F (37.1 °C)] 98.7 °F (37.1 °C)   Temp Source: Temp src: Oral   BP: BP: ()/(48-72) 106/72   Pulse: Heart Rate:  [79-93] 91   Respirations: Resp:  [16-18] 18         Intake/Output Summary (Last 24 hours) at 12/10/2018 0953  Last data filed at 12/10/2018 0410  Gross per 24 hour   Intake 1050 ml   Output 3400 ml   Net -2350 ml                                              Physical Exam     General Alert and awake, in NAD      CV Regular rate and rhythm      Lungs clear to auscultation bilaterally        Abdomen Soft, non-distended, fundus firm,  1 below umbilicus, appropriately tender      Incision  Dressing clean, dry and intact.      Extremities  tr edema, calves NT               LABS: Results from last 7 days   Lab Units  12/10/18   0430  18   0804   WBC 10*3/mm3  22.06*  11.95*   HEMOGLOBIN g/dL  12.4  13.8   HEMATOCRIT %  39.2  43.2   PLATELETS 10*3/mm3  184  228         Prenatal labs results reviewed:  Yes   Rubella:  Immune  Rh Status:    RH type   Date Value Ref Range Status   2018 Positive  Final                       Assessment/Plan   : 1. POD 1 S/P C/S: Hemodynamically stable.  Doing well.  Continue routine care.     Desires circ for baby boy-- d/w-- wants to wait until tomorrow    Inc WBC-- no evidence infection-- repeat cbc in AM      Pregnancy          Meeta Rowe, RADHA  12/10/2018  9:53 AM

## 2018-12-10 NOTE — LACTATION NOTE
Assisted mom with latching baby to left breast with NS after several attempts without NS.  Baby sucks tongue and doesn't want to open wide. Suck training performed. Educated mom on NS and importance of deep latching with and without NS. Encouraged mom to cont. Trying to latch baby with NS first but use if needed. Also encouraged mom to insurance pump 3-4 times a day. Gave OPLC and encouraged f/u    Lactation Consult Note    Evaluation Completed: 12/10/2018 9:54 AM  Patient Name: Shelby SAM  :  1985  MRN:  [id number]     REFERRAL  INFORMATION:                          Date of Referral: 18   Person Making Referral: patient, nurse  Maternal Reason for Referral: breastfeeding currently       DELIVERY HISTORY:          Skin to skin initiation date/time: 2018  5:15 AM   Skin to skin end date/time: 2018  6:17 AM         MATERNAL ASSESSMENT:                               INFANT ASSESSMENT:  Information for the patient's :  Familia SAM [[id number]]   No past medical history on file.    Feeding Readiness Cues: rooting (12/10/18 0900 : Holly Marcus RN)   Feeding Method: breastfeeding (12/10/18 0900 : Holly Marcus RN)   Feeding Tolerance/Success: adequate pause for breath, coordinated suck, coordinated swallow, strong suck(uncoordinated suck and tongue sucking at first) (12/10/18 0900 : Holly Marcus RN)                   Feeding Interventions: latch assistance provided, tongue stroked, sucking promoted, arousal required (12/10/18 0900 : Holly Marcus RN)       Additional Documentation: LATCH Score (Group) (12/10/18 0900 : Holly Marcus RN)               Infant Positioning: clutch/football (12/10/18 0900 : Holly Marcus, RN)           Effective Latch During Feeding: yes (12/10/18 0900 : Holly Marcus RN)   Suck/Swallow Coordination: present (12/10/18 0900 : Holly Marcus RN)   Signs of Milk Transfer: infant jaw motion  present, audible swallow (12/10/18 0900 : Holly Marcus RN)       Latch: 1-->repeated attempts, holds nipple in mouth, stimulate to suck (12/10/18 0900 : Holly Marcus RN)   Audible Swallowin-->spontaneous and intermittent (24 hrs old) (12/10/18 0900 : Holly Marcus RN)   Type of Nipple: 2-->everted (after stimulation) (12/10/18 0900 : Holly Marcus RN)   Comfort (Breast/Nipple): 2-->soft/nontender (12/10/18 0900 : Holly Marcus RN)   Hold (Positioning): 1-->minimal assist, teach one side, mother does other, staff holds (12/10/18 0900 : Holly Marcus RN)   Latch Score: 8 (12/10/18 0900 : Holly Marcus RN)                       MATERNAL INFANT FEEDING:                                                                       EQUIPMENT TYPE:                                 BREAST PUMPING:          LACTATION REFERRALS:

## 2018-12-11 PROBLEM — Z34.90 PREGNANCY: Status: RESOLVED | Noted: 2018-12-08 | Resolved: 2018-12-11

## 2018-12-11 LAB
DEPRECATED RDW RBC AUTO: 44.8 FL (ref 37–54)
ERYTHROCYTE [DISTWIDTH] IN BLOOD BY AUTOMATED COUNT: 12.8 % (ref 11.7–13)
HCT VFR BLD AUTO: 34.6 % (ref 35.6–45.5)
HGB BLD-MCNC: 11.4 G/DL (ref 11.9–15.5)
MCH RBC QN AUTO: 31.5 PG (ref 26.9–32)
MCHC RBC AUTO-ENTMCNC: 32.9 G/DL (ref 32.4–36.3)
MCV RBC AUTO: 95.6 FL (ref 80.5–98.2)
PLATELET # BLD AUTO: 219 10*3/MM3 (ref 140–500)
PMV BLD AUTO: 10.9 FL (ref 6–12)
RBC # BLD AUTO: 3.62 10*6/MM3 (ref 3.9–5.2)
WBC NRBC COR # BLD: 14.11 10*3/MM3 (ref 4.5–10.7)

## 2018-12-11 PROCEDURE — 85027 COMPLETE CBC AUTOMATED: CPT | Performed by: NURSE PRACTITIONER

## 2018-12-11 RX ADMIN — DOCUSATE SODIUM 100 MG: 100 CAPSULE, LIQUID FILLED ORAL at 11:24

## 2018-12-11 RX ADMIN — IBUPROFEN 600 MG: 600 TABLET ORAL at 09:27

## 2018-12-11 RX ADMIN — IBUPROFEN 600 MG: 600 TABLET ORAL at 18:06

## 2018-12-11 RX ADMIN — IBUPROFEN 600 MG: 600 TABLET ORAL at 01:13

## 2018-12-11 RX ADMIN — OXYCODONE AND ACETAMINOPHEN 1 TABLET: 5; 325 TABLET ORAL at 22:44

## 2018-12-11 RX ADMIN — DOCUSATE SODIUM 100 MG: 100 CAPSULE, LIQUID FILLED ORAL at 22:44

## 2018-12-11 RX ADMIN — OXYCODONE AND ACETAMINOPHEN 1 TABLET: 5; 325 TABLET ORAL at 09:28

## 2018-12-11 RX ADMIN — OXYCODONE AND ACETAMINOPHEN 1 TABLET: 5; 325 TABLET ORAL at 18:06

## 2018-12-11 RX ADMIN — OXYCODONE AND ACETAMINOPHEN 1 TABLET: 5; 325 TABLET ORAL at 13:51

## 2018-12-11 RX ADMIN — SIMETHICONE CHEW TAB 80 MG 80 MG: 80 TABLET ORAL at 01:13

## 2018-12-11 RX ADMIN — OXYCODONE AND ACETAMINOPHEN 1 TABLET: 5; 325 TABLET ORAL at 03:36

## 2018-12-11 NOTE — PROGRESS NOTES
[facility]   PROGRESS NOTE    Patient Name: Shelby SAM  :  1985  MRN:  [id number]      Post-Op 2 S/P   Subjective     Patient reports:   Spoke w/ -- pt doing well.  In shower x 2 visit to room      Objective       Vitals: Vital Signs Range for the last 24 hours  Temperature: Temp:  [98 °F (36.7 °C)-98.5 °F (36.9 °C)] 98.2 °F (36.8 °C)       BP: BP: (112-128)/(71-82) 128/71   Pulse: Heart Rate:  [76-88] 76   Respirations: Resp:  [16-18] 18                                         Physical Exam     General Alert -- in shower x 2 visits to room      Abdomen     Incision      Extremities                Assessment/Plan  :   POD 2  -  Doing well.  Continue usual cares.     Desires circ for baby boy-- d/w            * No active hospital problems. *               Meeta Rowe, APRN  2018  9:36 AM   Patient requested circ not be done until Wednesday.

## 2018-12-11 NOTE — PLAN OF CARE
Problem: Patient Care Overview  Goal: Plan of Care Review   12/11/18 7840   Coping/Psychosocial   Plan of Care Reviewed With patient   Plan of Care Review   Progress improving

## 2018-12-12 RX ADMIN — OXYCODONE AND ACETAMINOPHEN 1 TABLET: 5; 325 TABLET ORAL at 20:36

## 2018-12-12 RX ADMIN — IBUPROFEN 600 MG: 600 TABLET ORAL at 12:22

## 2018-12-12 RX ADMIN — IBUPROFEN 600 MG: 600 TABLET ORAL at 02:58

## 2018-12-12 RX ADMIN — OXYCODONE AND ACETAMINOPHEN 1 TABLET: 5; 325 TABLET ORAL at 02:58

## 2018-12-12 RX ADMIN — SIMETHICONE CHEW TAB 80 MG 80 MG: 80 TABLET ORAL at 02:57

## 2018-12-12 RX ADMIN — OXYCODONE AND ACETAMINOPHEN 1 TABLET: 5; 325 TABLET ORAL at 12:22

## 2018-12-12 RX ADMIN — IBUPROFEN 600 MG: 600 TABLET ORAL at 20:36

## 2018-12-12 RX ADMIN — OXYCODONE AND ACETAMINOPHEN 1 TABLET: 5; 325 TABLET ORAL at 07:35

## 2018-12-12 NOTE — PROGRESS NOTES
[facility]   PROGRESS NOTE    Patient Name: Shelby SAM  :  1985  MRN:  [id number]      Post-Op 3 S/P   Subjective     Patient reports:   Doing well. Pain well controlled. Tolerating regular diet and having flatus.    Lochia normal.       Objective       Vitals: Vital Signs Range for the last 24 hours  Temperature: Temp:  [97 °F (36.1 °C)-98.1 °F (36.7 °C)] 98.1 °F (36.7 °C)       BP: BP: (117-130)/(74-86) 117/74   Pulse: Heart Rate:  [80-87] 87   Respirations: Resp:  [16-18] 18                                         Physical Exam     General Alert       Abdomen Soft, non-distended, fundus firm, 1 below umbilicus, appropriately tender      Incision  Intact, no erythema or exudate      Extremities Calves NT bilaterally                Assessment/Plan  :   POD 3  -  Doing well.  Continue usual cares.   Desires circ for baby boy-- d/w         * No active hospital problems. *               Meeta Rowe, RADHA  2018  9:40 AM     Patient seen and examined. Agree with above assessment.   Carol Tillman MD  2018  11:56 AM

## 2018-12-12 NOTE — LACTATION NOTE
Lactation Consult Note  P1 term baby.  Yesterday baby started on formula supplementation after nursing d/t no output. Mom pumped x2. Educated on her Spectra pump and encouraged to pump after nursing every 2-3 hrs and feed all ebm to baby. With hand expression pt was able to get a few drops and baby nursed well with swallows noted. Will call for further assist.    Evaluation Completed: 2018 8:31 AM  Patient Name: Shelby SAM  :  1985  MRN:  [id number]     REFERRAL  INFORMATION:                          Date of Referral: 18   Person Making Referral: patient  Maternal Reason for Referral: breastfeeding currently       DELIVERY HISTORY:          Skin to skin initiation date/time: 2018  5:15 AM   Skin to skin end date/time: 2018  6:17 AM         MATERNAL ASSESSMENT:  Breast Size Issue: none (18 : Holly Stinson RN)  Breast Shape: Bilateral:, round (18 : Holly Stinson RN)  Breast Density: Bilateral:, soft (18 : Holly Stinson RN)  Areola: Bilateral:, elastic (18 : Holly Stinson RN)  Nipples: Bilateral:, everted (18 : Holly Stinson RN)                INFANT ASSESSMENT:  Information for the patient's :  Familia SAM [[id number]]   No past medical history on file.    Feeding Readiness Cues: quiet, rooting (18 : Holly Stinson RN)   Feeding Method: breastfeeding (18 : Holly Stinson RN)   Feeding Tolerance/Success: arousal required, coordinated suck, coordinated swallow (18 : Holly Stinson RN)                   Feeding Interventions: latch assistance provided (18 : Holly Stinson RN)                   Breastfeeding: breastfeeding, bilateral (18 : Holly Stinson RN)   Infant Positioning: clutch/football (18 : Holly Stinson RN)   Breastfeeding Time, Left (min): 20 (18 0827 :  Holly Stinson RN)       Effective Latch During Feeding: yes (18 : Holly Stinson RN)   Suck/Swallow Coordination: present (18 : Holly Stinson RN)   Signs of Milk Transfer: infant jaw motion present, suck/swallow ratio (18 : Holly Stinson RN)       Latch: 2-->grasps breast, tongue down, lips flanged, rhythmic sucking (18 : Holly Stinson RN)   Audible Swallowin-->a few with stimulation (18 : Holly Stinson RN)   Type of Nipple: 2-->everted (after stimulation) (18 : Holly Stinson RN)   Comfort (Breast/Nipple): 2-->soft/nontender (18 : Holly Stinson RN)   Hold (Positioning): 1-->minimal assist, teach one side, mother does other, staff holds (18 : Holly Stinson RN)   Latch Score: 8 (18 : Holly Stinson RN)     Infant-Driven Feeding Scales - Readiness: Alert once handled. Some rooting or takes pacifier. Adequate tone. (18 : Holly Stinson RN)   Infant-Driven Feeding Scales - Quality: Nipples with a strong coordinated SSB throughout feed. (18 : Holly Stinson RN)             MATERNAL INFANT FEEDING:     Maternal Emotional State: assist needed (18 : Holly Stinson RN)  Infant Positioning: clutch/football (18 : Holly Stinson RN)   Signs of Milk Transfer: infant jaw motion present, suck/swallow ratio (18 : Holly Stinson RN)  Pain with Feeding: no (18 : Holly Stinson RN)           Milk Ejection Reflex: present (18 : Holly Stinson RN)           Latch Assistance: yes (18 : Holly Stinson RN)                               EQUIPMENT TYPE:  Breast Pump Type: double electric, personal (18 : Holly Stinson RN)  Breast Pump Flange Type: hard (18 : Holly Stinson RN)  Breast  Pump Flange Size: 24 mm (12/12/18 0800 : Holly Stinson, RN)                        BREAST PUMPING:  Breast Pumping Interventions: frequent pumping encouraged, post-feed pumping encouraged (12/12/18 0800 : Holly Stinson, RN)       LACTATION REFERRALS:  Lactation Referrals: outpatient lactation program (12/12/18 0800 : Holly Stinson, RN)

## 2018-12-13 VITALS
DIASTOLIC BLOOD PRESSURE: 83 MMHG | RESPIRATION RATE: 18 BRPM | BODY MASS INDEX: 33.01 KG/M2 | HEART RATE: 78 BPM | HEIGHT: 63 IN | OXYGEN SATURATION: 95 % | WEIGHT: 186.31 LBS | SYSTOLIC BLOOD PRESSURE: 123 MMHG | TEMPERATURE: 98.3 F

## 2018-12-13 RX ORDER — IBUPROFEN 600 MG/1
600 TABLET ORAL EVERY 8 HOURS PRN
Qty: 30 TABLET | Refills: 3 | Status: SHIPPED | OUTPATIENT
Start: 2018-12-13 | End: 2021-07-21

## 2018-12-13 RX ORDER — OXYCODONE HYDROCHLORIDE AND ACETAMINOPHEN 5; 325 MG/1; MG/1
2 TABLET ORAL EVERY 4 HOURS PRN
Qty: 30 TABLET | Refills: 0 | Status: SHIPPED | OUTPATIENT
Start: 2018-12-13 | End: 2018-12-16

## 2018-12-13 RX ADMIN — OXYCODONE HYDROCHLORIDE AND ACETAMINOPHEN 1 TABLET: 7.5; 325 TABLET ORAL at 07:58

## 2018-12-13 RX ADMIN — OXYCODONE HYDROCHLORIDE AND ACETAMINOPHEN 1 TABLET: 7.5; 325 TABLET ORAL at 12:14

## 2018-12-13 RX ADMIN — IBUPROFEN 600 MG: 600 TABLET ORAL at 07:58

## 2018-12-13 RX ADMIN — DOCUSATE SODIUM 100 MG: 100 CAPSULE, LIQUID FILLED ORAL at 07:58

## 2018-12-13 RX ADMIN — OXYCODONE HYDROCHLORIDE AND ACETAMINOPHEN 1 TABLET: 7.5; 325 TABLET ORAL at 00:38

## 2018-12-13 NOTE — LACTATION NOTE
This note was copied from a baby's chart.  Baby has been receiving formula, Mom is pumping and her milk is coming in. Tried latching her with a nipple shield but she is too sleepy at present. Encouraged pumping every 2-3 hrs if baby is not latching well and insurance pumping it using the shield. She knows to feed all ebm to baby after latching. D/c today. [facility] card given.

## 2018-12-13 NOTE — DISCHARGE SUMMARY
Date of Discharge:  2018    Discharge Diagnosis: primary c/s    Presenting Problem/History of Present Illness  Pregnancy [Z34.90]       Hospital Course  Patient is a 33 y.o. female presented for term IOL.  Underwent primary c/s d/t FTD.  Delivered viable male infant per Dr. Tillman.   No pp complications.      Procedures Performed  Procedure(s):   SECTION PRIMARY         Condition on Discharge:  Post-Op Day 4 S/P   Subjective     Patient reports:    Doing well - ready for discharge. Pain well controlled. Tolerating po and   having flatus. Voiding and ambulating without difficulty. Lochia normal.     Vital Signs  Temp:  [98 °F (36.7 °C)-98.3 °F (36.8 °C)] 98.3 °F (36.8 °C)  Heart Rate:  [76-86] 78  Resp:  [18] 18  BP: (111-123)/(60-83) 123/83    Physical Exam    Gen Alert and awake   Abdomen Soft, ND,  Fundus firm with minimal tenderness   Incision  Intact, without erythema or exudate   Extremities Calves NT bilaterally     Assessment/Plan ]   Assessment:  POD 4  -  Doing well. Stable for discharge.     Plan:    Instructions reviewed       Consults:   Consults     No orders found from 2018 to 2018.          Discharge Disposition  Home or Self Care    Discharge Medications     Discharge Medications      New Medications      Instructions Start Date   ibuprofen 600 MG tablet  Commonly known as:  ADVIL,MOTRIN   600 mg, Oral, Every 8 Hours PRN      oxyCODONE-acetaminophen 5-325 MG per tablet  Commonly known as:  PERCOCET   2 tablets, Oral, Every 4 Hours PRN         Continue These Medications      Instructions Start Date   prenatal (CLASSIC) vitamin 28-0.8 MG tablet tablet   1 tablet, Oral, Daily             The patient has been prescribed a controlled substance.  She has been counseled on the risks associated with using the medication.   The addictive potential of this medication and alternatives were discussed carefully with this patient and she demonstrated understanding.  DARNELL KC  report has been obtained and reviewed.    Activity at Discharge:     Follow-up Appointments  No future appointments.  Additional Instructions for the Follow-ups that You Need to Schedule     Call MD With Problems / Concerns   As directed      Instructions: call for fever, increased vaginal bleeding or pain, any other concerns    Order Comments:  Instructions: call for fever, increased vaginal bleeding or pain, any other concerns                Test Results Pending at Discharge       RADHA Granado  12/13/18  9:30 AM

## 2018-12-13 NOTE — PLAN OF CARE
Problem: Patient Care Overview  Goal: Plan of Care Review  Outcome: Ongoing (interventions implemented as appropriate)   18 0216   Coping/Psychosocial   Plan of Care Reviewed With patient   Plan of Care Review   Progress improving       Problem: Breastfeeding (Adult,Obstetrics,Pediatric)  Goal: Signs and Symptoms of Listed Potential Problems Will be Absent, Minimized or Managed (Breastfeeding)  Outcome: Ongoing (interventions implemented as appropriate)      Problem: Postpartum ( Delivery) (Adult,Obstetrics,Pediatric)  Goal: Signs and Symptoms of Listed Potential Problems Will be Absent, Minimized or Managed (Postpartum)  Outcome: Ongoing (interventions implemented as appropriate)

## 2018-12-19 ENCOUNTER — TELEPHONE (OUTPATIENT)
Dept: LACTATION | Facility: HOSPITAL | Age: 33
End: 2018-12-19

## 2018-12-26 NOTE — L&D DELIVERY NOTE
[facility]   Section Operative Note    Pre-Operative Dx:   1.  IUP at 40w3d weeks   2. Arrest of Descent         Postoperative Dx:  Same        Procedure: , Low Transverse    Surgeon: Carol Tillman         Anesthesia: Epidural    EBL: 800 mL         Specimens:   Order Name Source Comment Collection Info Order Time   CBC (NO DIFF)   Collected By: Beti Quintero 2018 12:02 AM        Findings:                           Amniotic Fluid clear  Uterus normal  Tubes and ovaries normal bilaterally              Infant:            Gender: Viable male  infant     Weight: 3175 g (6 lb 16 oz)     Apgars: 9   @ 1 minute /     9   @ 5 minutes                 Indication for C/Section:     Arrest of Descent                Procedure Details:  The patient was taken to the operating room where epidural anesthesia was found to be adequate. She was prepped and draped in the usual sterile manner in the dorsal supine position with leftward tilt. A Pfannenstiel incision was made and carried down to the fascia. The fascia was incised in the mid-line and extended transversely with Mckenzie scissors. The fascia was grasped and elevated and  from the underlying rectus muscles superiorly and inferiorly. The peritoneum was identified and entered. Peritoneal incision was extended superiorly and inferiorly with good visualization of the bladder. The bladder blade was inserted and the vesicouterine peritoneum was incised transversely and the bladder flap was created digitally. The bladder blade was reinserted. The  lower uterine segment was incised in a transverse fashion.  The head was elevated and delivered through the incision. The remainder of the infant was delivered without difficulty. The umbilical cord was clamped and cut and the infant was handed off the field. Cord ph and cord bloods were obtained. The placenta was removed intact and appeared normal. The uterine incision was closed with running locked  sutures of 0 monocryl. The abdominal cavity was irrigated and cleared of all clot and debris. The uterine incision was inspected and noted to be hemostatic. Rectus muscles were reapproximated in the midline with 0 chromic.  The fascia was closed with 0 PDS in running continuous fashion. The subcutaneous tissue was closed with 3-0 chromic. The skin was closed in subcuticular fashion with 4-0 Monocryl.   Instrument, sponge, and needle counts were correct prior the abdominal closure and at the conclusion of the case. Mother and baby  to recovery room in stable condition.              Complications:     None          Antibiotics: cefazolin (Ancef)                      Carol Tillman MD  12/25/2018  10:36 PM

## 2021-07-21 ENCOUNTER — OFFICE VISIT (OUTPATIENT)
Dept: FAMILY MEDICINE CLINIC | Facility: CLINIC | Age: 36
End: 2021-07-21

## 2021-07-21 VITALS
WEIGHT: 156.2 LBS | TEMPERATURE: 98.2 F | SYSTOLIC BLOOD PRESSURE: 118 MMHG | HEART RATE: 76 BPM | BODY MASS INDEX: 27.68 KG/M2 | HEIGHT: 63 IN | OXYGEN SATURATION: 98 % | DIASTOLIC BLOOD PRESSURE: 87 MMHG

## 2021-07-21 DIAGNOSIS — Z13.29 SCREENING FOR THYROID DISORDER: ICD-10-CM

## 2021-07-21 DIAGNOSIS — Z76.89 ESTABLISHING CARE WITH NEW DOCTOR, ENCOUNTER FOR: Primary | ICD-10-CM

## 2021-07-21 DIAGNOSIS — Z00.00 ANNUAL PHYSICAL EXAM: ICD-10-CM

## 2021-07-21 DIAGNOSIS — Z13.220 SCREENING FOR LIPID DISORDERS: ICD-10-CM

## 2021-07-21 DIAGNOSIS — Z11.59 NEED FOR HEPATITIS C SCREENING TEST: ICD-10-CM

## 2021-07-21 PROCEDURE — 99385 PREV VISIT NEW AGE 18-39: CPT | Performed by: PHYSICIAN ASSISTANT

## 2021-07-21 NOTE — PROGRESS NOTES
"Chief Complaint  Chief Complaint   Patient presents with   • Annual Exam     New Patient to establish care, physical for insurance        Subjective          Shelby Jon presents to [facility]  History of Present Illness   New patient to establish care and needing physical exam for her 's employment insurance. No concerns today     Last pap: 2020 with Dr. Santamaria.    Last labs: about 7 years ago except with pregnancy.    Medical History: has no past medical history on file.   Surgical History: has a past surgical history that includes [place] tooth extraction and  section (Bilateral, 2018).   Family History: family history includes Diabetes in her father; Lung cancer in her paternal grandmother.   Social History: reports that she has never smoked. She has never used smokeless tobacco. She reports that she does not drink alcohol and does not use drugs.    Allergies: Patient has no known allergies.    Health Maintenance Due   Topic Date Due   • ANNUAL PHYSICAL  Never done   • COVID-19 Vaccine (1) Never done   • HEPATITIS C SCREENING  Never done   • PAP SMEAR  Never done       Immunization History   Administered Date(s) Administered   • DTaP 2018   • Influenza, Unspecified 10/11/2018   • Tdap 2018       Objective     Vitals:    21 1209   BP: 118/87   Pulse: 76   Temp: 98.2 °F (36.8 °C)   TempSrc: Temporal   SpO2: 98%   Weight: 70.9 kg (156 lb 3.2 oz)   Height: 160 cm (63\")     Body mass index is 27.67 kg/m².       Physical Exam  Vitals and nursing note reviewed.   Constitutional:       Appearance: Normal appearance.   HENT:      Head: Normocephalic and atraumatic.      Right Ear: Tympanic membrane and ear canal normal.      Left Ear: Tympanic membrane and ear canal normal.   Neck:      Vascular: No carotid bruit.      Comments: Thyroid : gland size normal, nontender, no nodules or masses present on palpation   Cardiovascular:      Rate and Rhythm: " Normal rate and regular rhythm.      Pulses: Normal pulses.      Heart sounds: Normal heart sounds.   Pulmonary:      Effort: Pulmonary effort is normal.      Breath sounds: Normal breath sounds.   Abdominal:      Palpations: Abdomen is soft. There is no mass.      Tenderness: There is no abdominal tenderness.   Musculoskeletal:      Cervical back: Neck supple. No tenderness.      Right lower leg: No edema.      Left lower leg: No edema.   Lymphadenopathy:      Cervical: No cervical adenopathy.   Neurological:      Mental Status: She is alert.   Psychiatric:         Mood and Affect: Mood normal.         Behavior: Behavior normal.           Result Review :                           Assessment and Plan      Diagnoses and all orders for this visit:    1. Establishing care with new doctor, encounter for (Primary)    2. Annual physical exam  -     Comprehensive Metabolic Panel; Future  -     Lipid Panel; Future  -     CBC & Differential; Future  -     TSH; Future    3. Need for hepatitis C screening test  -     Hepatitis C antibody; Future    4. Screening for lipid disorders  -     Comprehensive Metabolic Panel; Future  -     Lipid Panel; Future    5. Screening for thyroid disorder  -     TSH; Future    Pertinent health maintenance and preventative counseling discussed.        Follow Up     Return as needed.    Patient was given instructions and counseling regarding her condition or for health maintenance advice. Please see specific information pulled into the AVS if appropriate.

## 2021-08-19 ENCOUNTER — TELEPHONE (OUTPATIENT)
Dept: FAMILY MEDICINE CLINIC | Facility: CLINIC | Age: 36
End: 2021-08-19

## 2021-08-27 ENCOUNTER — LAB (OUTPATIENT)
Dept: LAB | Facility: HOSPITAL | Age: 36
End: 2021-08-27

## 2021-08-27 LAB
ALBUMIN SERPL-MCNC: 4.2 G/DL (ref 3.5–5.2)
ALBUMIN/GLOB SERPL: 1.6 G/DL
ALP SERPL-CCNC: 46 U/L (ref 39–117)
ALT SERPL W P-5'-P-CCNC: 10 U/L (ref 1–33)
ANION GAP SERPL CALCULATED.3IONS-SCNC: 6.5 MMOL/L (ref 5–15)
AST SERPL-CCNC: 15 U/L (ref 1–32)
BASOPHILS # BLD AUTO: 0.05 10*3/MM3 (ref 0–0.2)
BASOPHILS NFR BLD AUTO: 0.8 % (ref 0–1.5)
BILIRUB SERPL-MCNC: 0.3 MG/DL (ref 0–1.2)
BUN SERPL-MCNC: 11 MG/DL (ref 6–20)
BUN/CREAT SERPL: 20 (ref 7–25)
CALCIUM SPEC-SCNC: 8.9 MG/DL (ref 8.6–10.5)
CHLORIDE SERPL-SCNC: 105 MMOL/L (ref 98–107)
CHOLEST SERPL-MCNC: 137 MG/DL (ref 0–200)
CO2 SERPL-SCNC: 25.5 MMOL/L (ref 22–29)
CREAT SERPL-MCNC: 0.55 MG/DL (ref 0.57–1)
DEPRECATED RDW RBC AUTO: 39.5 FL (ref 37–54)
EOSINOPHIL # BLD AUTO: 0.22 10*3/MM3 (ref 0–0.4)
EOSINOPHIL NFR BLD AUTO: 3.7 % (ref 0.3–6.2)
ERYTHROCYTE [DISTWIDTH] IN BLOOD BY AUTOMATED COUNT: 11.9 % (ref 12.3–15.4)
GFR SERPL CREATININE-BSD FRML MDRD: 126 ML/MIN/1.73
GLOBULIN UR ELPH-MCNC: 2.7 GM/DL
GLUCOSE SERPL-MCNC: 91 MG/DL (ref 65–99)
HCT VFR BLD AUTO: 38.6 % (ref 34–46.6)
HCV AB SER DONR QL: NORMAL
HDLC SERPL-MCNC: 54 MG/DL (ref 40–60)
HGB BLD-MCNC: 13 G/DL (ref 12–15.9)
IMM GRANULOCYTES # BLD AUTO: 0.02 10*3/MM3 (ref 0–0.05)
IMM GRANULOCYTES NFR BLD AUTO: 0.3 % (ref 0–0.5)
LDLC SERPL CALC-MCNC: 75 MG/DL (ref 0–100)
LDLC/HDLC SERPL: 1.42 {RATIO}
LYMPHOCYTES # BLD AUTO: 2.55 10*3/MM3 (ref 0.7–3.1)
LYMPHOCYTES NFR BLD AUTO: 42.5 % (ref 19.6–45.3)
MCH RBC QN AUTO: 30.5 PG (ref 26.6–33)
MCHC RBC AUTO-ENTMCNC: 33.7 G/DL (ref 31.5–35.7)
MCV RBC AUTO: 90.6 FL (ref 79–97)
MONOCYTES # BLD AUTO: 0.3 10*3/MM3 (ref 0.1–0.9)
MONOCYTES NFR BLD AUTO: 5 % (ref 5–12)
NEUTROPHILS NFR BLD AUTO: 2.86 10*3/MM3 (ref 1.7–7)
NEUTROPHILS NFR BLD AUTO: 47.7 % (ref 42.7–76)
NRBC BLD AUTO-RTO: 0.2 /100 WBC (ref 0–0.2)
PLATELET # BLD AUTO: 310 10*3/MM3 (ref 140–450)
PMV BLD AUTO: 10.4 FL (ref 6–12)
POTASSIUM SERPL-SCNC: 3.8 MMOL/L (ref 3.5–5.2)
PROT SERPL-MCNC: 6.9 G/DL (ref 6–8.5)
RBC # BLD AUTO: 4.26 10*6/MM3 (ref 3.77–5.28)
SODIUM SERPL-SCNC: 137 MMOL/L (ref 136–145)
TRIGL SERPL-MCNC: 32 MG/DL (ref 0–150)
TSH SERPL DL<=0.05 MIU/L-ACNC: 1.41 UIU/ML (ref 0.27–4.2)
VLDLC SERPL-MCNC: 8 MG/DL (ref 5–40)
WBC # BLD AUTO: 6 10*3/MM3 (ref 3.4–10.8)

## 2021-08-27 PROCEDURE — 80053 COMPREHEN METABOLIC PANEL: CPT | Performed by: PHYSICIAN ASSISTANT

## 2021-08-27 PROCEDURE — 86803 HEPATITIS C AB TEST: CPT | Performed by: PHYSICIAN ASSISTANT

## 2021-08-27 PROCEDURE — 84443 ASSAY THYROID STIM HORMONE: CPT | Performed by: PHYSICIAN ASSISTANT

## 2021-08-27 PROCEDURE — 85025 COMPLETE CBC W/AUTO DIFF WBC: CPT | Performed by: PHYSICIAN ASSISTANT

## 2021-08-27 PROCEDURE — 36415 COLL VENOUS BLD VENIPUNCTURE: CPT | Performed by: PHYSICIAN ASSISTANT

## 2021-08-27 PROCEDURE — 80061 LIPID PANEL: CPT | Performed by: PHYSICIAN ASSISTANT

## 2022-02-21 ENCOUNTER — OFFICE VISIT (OUTPATIENT)
Dept: OBSTETRICS AND GYNECOLOGY | Facility: CLINIC | Age: 37
End: 2022-02-21

## 2022-02-21 VITALS
DIASTOLIC BLOOD PRESSURE: 85 MMHG | HEIGHT: 63 IN | BODY MASS INDEX: 28.88 KG/M2 | SYSTOLIC BLOOD PRESSURE: 127 MMHG | WEIGHT: 163 LBS | HEART RATE: 81 BPM

## 2022-02-21 DIAGNOSIS — Z01.419 WELL WOMAN EXAM: Primary | ICD-10-CM

## 2022-02-21 PROCEDURE — 87624 HPV HI-RISK TYP POOLED RSLT: CPT | Performed by: NURSE PRACTITIONER

## 2022-02-21 PROCEDURE — G0123 SCREEN CERV/VAG THIN LAYER: HCPCS | Performed by: NURSE PRACTITIONER

## 2022-02-21 PROCEDURE — 99395 PREV VISIT EST AGE 18-39: CPT | Performed by: NURSE PRACTITIONER

## 2022-02-21 NOTE — PROGRESS NOTES
"  HPI:   36 y.o..Presents for well woman exam. Contraception or HRT: Contraception:  Condoms  Menses:   q month, lasts 5 days, changes products q 2 hrs on heaviest days.   Pain:  None  Last pap normal   Complaints: Pt has no complaints today.  Patient reports that she is not currently experiencing any symptoms of urinary incontinence.      History reviewed. No pertinent past medical history.   Past Surgical History:   Procedure Laterality Date   •  SECTION Bilateral 2018    Procedure:  SECTION PRIMARY;  Surgeon: Carol Tillman MD;  Location: [facility] LABOR DELIVERY;  Service: Obstetrics/Gynecology   • WISDOM TOOTH EXTRACTION        Family History   Problem Relation Age of Onset   • Diabetes Father    • Lung cancer Paternal Grandmother         PCP: does manage PMHx and preventative labs      PHYSICAL EXAM: Chaperone present /85   Pulse 81   Ht 160 cm (63\")   Wt 73.9 kg (163 lb)   LMP 2022 (Exact Date)   Breastfeeding Yes   BMI 28.87 kg/m²   General- NAD, alert and oriented, appropriate  Psych- Normal mood, good memory  Neck- No masses, no thyroid enlargement  Lymphatic- No palpable neck, axillary, or groin nodes  CV- Regular rhythm, no murmurs  Resp- CTA to bases, no wheezes  Abdomen- Soft, non distended, non tender, no masses  Breast left-  Bilaterally symmetrical, no masses, non tender, no nipple discharge  Breast right- Bilaterally symmetrical, no masses, non tender, no nipple discharge  External genitalia- Normal female, no lesions  Urethra/meatus- Normal, no masses, non tender, no prolapse  Bladder- Normal, no masses, non tender, no prolapse  Vagina- Normal, no atrophy, no lesions, no discharge, no prolapse  Cvx- Normal, no lesions, no discharge, No cervical motion tenderness  Uterus- Normal size, shape & consistency.  Non tender, mobile, & no prolapse  Adnexa- No mass, non tender  Anus/Rectum/Perineum- Not performed  Ext- No edema, no cyanosis    Skin- No " lesions, no rashes, no acanthosis nigricans      ASSESSMENT and PLAN:    Diagnoses and all orders for this visit:    1. Well woman exam (Primary)  -     IGP,rfx Aptima HPV All Pth    Counseling:     All BC Options R/B/A/SE/E of each reviewed  declines contraception, Continue with lactation at night/condoms    Preventative:   BREAST HEALTH- Monthly self breast exam importance and how to reviewed. MMG and/or MRI (prn) reviewed per society guidelines and her individual history. Screen: Updated today.  CERVICAL CANCER Screening- Reviewed current ASCCP guidelines for screening w and wo cotest HPV, age specific.  Screen: Updated today.  COLON CANCER Screening- Reviewed current medical society guidelines and options.  Screen:  Updated today.  Follow up PCP/Specialist PMHx and Labs  Myriad: Does not qualify.  Pt declines FLU vaccine  Declines COVID vaccine      She understands the importance of having any ordered tests to be performed in a timely fashion.  The risks of not performing them include, but are not limited to, advanced cancer stages, bone loss from osteoporosis and/or subsequent increase in morbidity and/or mortality.  She is encouraged to review her results online and/or contact or office if she has questions.     Follow Up:  No follow-ups on file.  Jana Billy, APRN  02/21/2022

## 2022-02-28 LAB
CONV .: ABNORMAL
CYTOLOGIST CVX/VAG CYTO: ABNORMAL
CYTOLOGY CVX/VAG DOC CYTO: ABNORMAL
CYTOLOGY CVX/VAG DOC THIN PREP: ABNORMAL
DX ICD CODE: ABNORMAL
DX ICD CODE: ABNORMAL
HIV 1 & 2 AB SER-IMP: ABNORMAL
HPV I/H RISK 4 DNA CVX QL PROBE+SIG AMP: NEGATIVE
OTHER STN SPEC: ABNORMAL
PATHOLOGIST CVX/VAG CYTO: ABNORMAL
RECOM F/U CVX/VAG CYTO: ABNORMAL
STAT OF ADQ CVX/VAG CYTO-IMP: ABNORMAL

## 2022-03-01 ENCOUNTER — TELEPHONE (OUTPATIENT)
Dept: OBSTETRICS AND GYNECOLOGY | Facility: CLINIC | Age: 37
End: 2022-03-01

## 2022-03-01 NOTE — TELEPHONE ENCOUNTER
----- Message from RADHA Domingo sent at 3/1/2022 11:35 AM EST -----  ASC-US, HPV-, repeat in 1 year

## 2022-03-01 NOTE — TELEPHONE ENCOUNTER
Patient returned call and was advised of results and recommendations. She will call back at a later date to set up her yearly pap.

## 2022-06-03 ENCOUNTER — TELEMEDICINE (OUTPATIENT)
Dept: FAMILY MEDICINE CLINIC | Facility: TELEHEALTH | Age: 37
End: 2022-06-03

## 2022-06-03 DIAGNOSIS — U07.1 COVID-19: Primary | ICD-10-CM

## 2022-06-03 PROCEDURE — 99213 OFFICE O/P EST LOW 20 MIN: CPT | Performed by: NURSE PRACTITIONER

## 2022-06-03 RX ORDER — ALBUTEROL SULFATE 90 UG/1
2 AEROSOL, METERED RESPIRATORY (INHALATION) EVERY 4 HOURS PRN
Qty: 18 G | Refills: 0 | Status: SHIPPED | OUTPATIENT
Start: 2022-06-03

## 2022-06-03 NOTE — PROGRESS NOTES
"Subjective   Shelby Jon is a 36 y.o. female.     She tested positive for Covid-19 5/28. Symptoms include body aches, cough, congestion. Her cough is bothersome and she is having \"coughing fits.\" She is wanting to know if she can take anything for cough that won't make her drowsy; she is also breastfeeding but only once per day. She had a fever but no longer. She has been taking tylenol and ibuprofen for body aches but no other medications.          The following portions of the patient's history were reviewed and updated as appropriate: allergies, current medications, past family history, past medical history, past social history, past surgical history, and problem list.    Review of Systems   Constitutional: Negative for fever.   HENT: Positive for congestion and rhinorrhea. Negative for postnasal drip.    Respiratory: Positive for cough (occasionally productive, mostly dry). Negative for chest tightness, shortness of breath and wheezing.    Cardiovascular: Negative for chest pain.   Musculoskeletal: Positive for myalgias.       Objective   Physical Exam  Constitutional:       General: She is not in acute distress.     Appearance: She is well-developed. She is not diaphoretic.   Pulmonary:      Effort: Pulmonary effort is normal.   Neurological:      Mental Status: She is alert and oriented to person, place, and time.   Psychiatric:         Behavior: Behavior normal.           Assessment & Plan   Diagnoses and all orders for this visit:    1. COVID-19 (Primary)  -     dextromethorphan 15 MG/5ML syrup; Take 10 mL by mouth 4 (Four) Times a Day As Needed for Cough.  Dispense: 118 mL; Refill: 0  -     albuterol sulfate  (90 Base) MCG/ACT inhaler; Inhale 2 puffs Every 4 (Four) Hours As Needed for Wheezing or Shortness of Air.  Dispense: 18 g; Refill: 0                 The use of a video visit has been reviewed with the patient and verbal informed consent has been obtained. Myself and Shelby Jon participated " in this visit. The patient is located in [place], [place]. I am located in [place], [place]. Mychart and Zoom were utilized. I spent 20 minutes in the patient's chart for this visit.

## 2022-06-03 NOTE — PATIENT INSTRUCTIONS
Push fluids, rest  Tylenol/ibuprofen for pain or fever>101  Saline nasal spray/irrigation, humidified air for sinus symptoms  Flonase, mucinex with a full glass of water for congestion  Do not suppress your cough unless it prevents you from resting  Follow up if symptoms worsen or do not improve in 1 week.

## 2023-01-17 ENCOUNTER — OFFICE VISIT (OUTPATIENT)
Dept: FAMILY MEDICINE CLINIC | Facility: CLINIC | Age: 38
End: 2023-01-17
Payer: COMMERCIAL

## 2023-01-17 ENCOUNTER — HOSPITAL ENCOUNTER (OUTPATIENT)
Dept: GENERAL RADIOLOGY | Facility: HOSPITAL | Age: 38
Discharge: HOME | End: 2023-01-17
Payer: COMMERCIAL

## 2023-01-17 ENCOUNTER — LAB (OUTPATIENT)
Dept: LAB | Facility: HOSPITAL | Age: 38
End: 2023-01-17
Payer: COMMERCIAL

## 2023-01-17 VITALS
HEART RATE: 90 BPM | DIASTOLIC BLOOD PRESSURE: 76 MMHG | HEIGHT: 63 IN | BODY MASS INDEX: 29.23 KG/M2 | SYSTOLIC BLOOD PRESSURE: 122 MMHG | WEIGHT: 165 LBS | OXYGEN SATURATION: 98 %

## 2023-01-17 DIAGNOSIS — M79.604 LEG PAIN, ANTERIOR, RIGHT: Primary | ICD-10-CM

## 2023-01-17 DIAGNOSIS — R23.3 EASY BRUISING: ICD-10-CM

## 2023-01-17 DIAGNOSIS — M79.604 LEG PAIN, ANTERIOR, RIGHT: ICD-10-CM

## 2023-01-17 DIAGNOSIS — R59.1 LYMPHADENOPATHY: ICD-10-CM

## 2023-01-17 DIAGNOSIS — R23.8 VESICULAR LESION: ICD-10-CM

## 2023-01-17 DIAGNOSIS — R53.83 OTHER FATIGUE: ICD-10-CM

## 2023-01-17 LAB
ALBUMIN SERPL-MCNC: 4.8 G/DL (ref 3.5–5.2)
ALBUMIN/GLOB SERPL: 2 G/DL
ALP SERPL-CCNC: 44 U/L (ref 39–117)
ALT SERPL W P-5'-P-CCNC: 14 U/L (ref 1–33)
ANION GAP SERPL CALCULATED.3IONS-SCNC: 6 MMOL/L (ref 5–15)
AST SERPL-CCNC: 15 U/L (ref 1–32)
BASOPHILS # BLD AUTO: 0.07 10*3/MM3 (ref 0–0.2)
BASOPHILS NFR BLD AUTO: 1 % (ref 0–1.5)
BILIRUB SERPL-MCNC: 0.3 MG/DL (ref 0–1.2)
BUN SERPL-MCNC: 18 MG/DL (ref 6–20)
BUN/CREAT SERPL: 30 (ref 7–25)
CALCIUM SPEC-SCNC: 9.6 MG/DL (ref 8.6–10.5)
CHLORIDE SERPL-SCNC: 105 MMOL/L (ref 98–107)
CO2 SERPL-SCNC: 29 MMOL/L (ref 22–29)
CREAT SERPL-MCNC: 0.6 MG/DL (ref 0.57–1)
DEPRECATED RDW RBC AUTO: 39.6 FL (ref 37–54)
EGFRCR SERPLBLD CKD-EPI 2021: 119.5 ML/MIN/1.73
EOSINOPHIL # BLD AUTO: 0.14 10*3/MM3 (ref 0–0.4)
EOSINOPHIL NFR BLD AUTO: 2 % (ref 0.3–6.2)
ERYTHROCYTE [DISTWIDTH] IN BLOOD BY AUTOMATED COUNT: 11.8 % (ref 12.3–15.4)
GLOBULIN UR ELPH-MCNC: 2.4 GM/DL
GLUCOSE SERPL-MCNC: 88 MG/DL (ref 65–99)
HCT VFR BLD AUTO: 41.2 % (ref 34–46.6)
HGB BLD-MCNC: 13.6 G/DL (ref 12–15.9)
IMM GRANULOCYTES # BLD AUTO: 0.02 10*3/MM3 (ref 0–0.05)
IMM GRANULOCYTES NFR BLD AUTO: 0.3 % (ref 0–0.5)
IRON 24H UR-MRATE: 56 MCG/DL (ref 37–145)
IRON SATN MFR SERPL: 18 % (ref 20–50)
LYMPHOCYTES # BLD AUTO: 2.3 10*3/MM3 (ref 0.7–3.1)
LYMPHOCYTES NFR BLD AUTO: 33.4 % (ref 19.6–45.3)
MCH RBC QN AUTO: 30.1 PG (ref 26.6–33)
MCHC RBC AUTO-ENTMCNC: 33 G/DL (ref 31.5–35.7)
MCV RBC AUTO: 91.2 FL (ref 79–97)
MONOCYTES # BLD AUTO: 0.45 10*3/MM3 (ref 0.1–0.9)
MONOCYTES NFR BLD AUTO: 6.5 % (ref 5–12)
NEUTROPHILS NFR BLD AUTO: 3.9 10*3/MM3 (ref 1.7–7)
NEUTROPHILS NFR BLD AUTO: 56.8 % (ref 42.7–76)
NRBC BLD AUTO-RTO: 0 /100 WBC (ref 0–0.2)
PLATELET # BLD AUTO: 356 10*3/MM3 (ref 140–450)
PMV BLD AUTO: 10.4 FL (ref 6–12)
POTASSIUM SERPL-SCNC: 4.4 MMOL/L (ref 3.5–5.2)
PROT SERPL-MCNC: 7.2 G/DL (ref 6–8.5)
RBC # BLD AUTO: 4.52 10*6/MM3 (ref 3.77–5.28)
SODIUM SERPL-SCNC: 140 MMOL/L (ref 136–145)
T4 FREE SERPL-MCNC: 1.02 NG/DL (ref 0.93–1.7)
TIBC SERPL-MCNC: 319 MCG/DL (ref 298–536)
TRANSFERRIN SERPL-MCNC: 214 MG/DL (ref 200–360)
TSH SERPL DL<=0.05 MIU/L-ACNC: 1.81 UIU/ML (ref 0.27–4.2)
VIT B12 BLD-MCNC: 903 PG/ML (ref 211–946)
WBC NRBC COR # BLD: 6.88 10*3/MM3 (ref 3.4–10.8)

## 2023-01-17 PROCEDURE — 86695 HERPES SIMPLEX TYPE 1 TEST: CPT

## 2023-01-17 PROCEDURE — 80050 GENERAL HEALTH PANEL: CPT

## 2023-01-17 PROCEDURE — 36415 COLL VENOUS BLD VENIPUNCTURE: CPT

## 2023-01-17 PROCEDURE — 73590 X-RAY EXAM OF LOWER LEG: CPT

## 2023-01-17 PROCEDURE — 83540 ASSAY OF IRON: CPT

## 2023-01-17 PROCEDURE — 82607 VITAMIN B-12: CPT

## 2023-01-17 PROCEDURE — 86696 HERPES SIMPLEX TYPE 2 TEST: CPT

## 2023-01-17 PROCEDURE — 84466 ASSAY OF TRANSFERRIN: CPT

## 2023-01-17 PROCEDURE — 84439 ASSAY OF FREE THYROXINE: CPT

## 2023-01-17 PROCEDURE — 99214 OFFICE O/P EST MOD 30 MIN: CPT | Performed by: NURSE PRACTITIONER

## 2023-01-17 RX ORDER — MULTIPLE VITAMINS W/ MINERALS TAB 9MG-400MCG
1 TAB ORAL DAILY
COMMUNITY

## 2023-01-17 NOTE — PROGRESS NOTES
Chief Complaint  Mouth Lesions, Adenopathy, and Bleeding/Bruising    SUBJECTIVE  Shelby Jon presents to [facility]     Pt reports had COVID after leah- pos home test, but pt reports since then she has been having a lot of sores in her mouth, on her lips.   Pt here today to discuss the recurring mouth sore, random brusiing, and possible swollen lymph node.     Pt also c/o lymph node swollen in neck, pt concerned because she had a paternal aunt who passed from leukemia in her 20's. Has been swollen and painful for at least 3 weeks without improvement     Denies any night sweats, nose bleeds, bone pain, no fever, does report frequent bruising.     Also c/o multiple sores on/ in mouth. Some have been inside mouth, and some have been on outside of lip. Has one on outside of lip currently.     C/o right shin tenderness onset 2 months , was initally bruised, bruise resolved but still TTT     History of Present Illness  History reviewed. No pertinent past medical history.   Family History   Problem Relation Age of Onset   • Diabetes Father    • Lung cancer Paternal Grandmother       Past Surgical History:   Procedure Laterality Date   •  SECTION Bilateral 2018    Procedure:  SECTION PRIMARY;  Surgeon: Carol Tillman MD;  Location: [facility] LABOR DELIVERY;  Service: Obstetrics/Gynecology   • WISDOM TOOTH EXTRACTION          Current Outpatient Medications:   •  Acetaminophen (TYLENOL EXTRA STRENGTH PO), , Disp: , Rfl:   •  albuterol sulfate  (90 Base) MCG/ACT inhaler, Inhale 2 puffs Every 4 (Four) Hours As Needed for Wheezing or Shortness of Air., Disp: 18 g, Rfl: 0  •  multivitamin with minerals tablet tablet, Take 1 tablet by mouth Daily., Disp: , Rfl:   •  dextromethorphan 15 MG/5ML syrup, Take 10 mL by mouth 4 (Four) Times a Day As Needed for Cough., Disp: 118 mL, Rfl: 0  •  Prenatal Vit-Fe Fumarate-FA (PRENATAL, CLASSIC, VITAMIN) 28-0.8 MG tablet  "tablet, Take 1 tablet by mouth Daily., Disp: , Rfl:     OBJECTIVE  Vital Signs:   /76   Pulse 90   Ht 160 cm (63\")   Wt 74.8 kg (165 lb)   SpO2 98%   Breastfeeding No   BMI 29.23 kg/m²    Estimated body mass index is 29.23 kg/m² as calculated from the following:    Height as of this encounter: 160 cm (63\").    Weight as of this encounter: 74.8 kg (165 lb).     Wt Readings from Last 3 Encounters:   01/17/23 74.8 kg (165 lb)   02/21/22 73.9 kg (163 lb)   07/21/21 70.9 kg (156 lb 3.2 oz)     BP Readings from Last 3 Encounters:   01/17/23 122/76   02/21/22 127/85   07/21/21 118/87       Physical Exam  Vitals reviewed.   Constitutional:       Appearance: Normal appearance. She is well-developed.   HENT:      Head: Normocephalic and atraumatic.      Right Ear: External ear normal.      Left Ear: External ear normal.   Eyes:      Conjunctiva/sclera: Conjunctivae normal.      Pupils: Pupils are equal, round, and reactive to light.   Cardiovascular:      Rate and Rhythm: Normal rate and regular rhythm.      Heart sounds: No murmur heard.    No friction rub. No gallop.   Pulmonary:      Effort: Pulmonary effort is normal.      Breath sounds: Normal breath sounds. No wheezing or rhonchi.   Musculoskeletal:      Right lower leg: Bony tenderness present. No swelling or deformity. No edema.      Comments: Tenderness right anterior shin   Lymphadenopathy:      Comments: Tenderness in the right submandibular lesion, no obvious adenopathy palpable   Skin:     General: Skin is warm and dry.      Comments: Vesicular lesion noted to right right side of lower lip.     A few scattered bruises noted on forearms       Neurological:      Mental Status: She is alert and oriented to person, place, and time.      Cranial Nerves: No cranial nerve deficit.   Psychiatric:         Mood and Affect: Mood and affect normal.         Behavior: Behavior normal.         Thought Content: Thought content normal.         Judgment: Judgment " normal.          Result Review              No Images in the past 120 days found..     The above data has been reviewed by RADHA Ledezma 01/17/2023 09:24 EST.          Patient Care Team:  Adry Puga PA as PCP - General (Family Medicine)        ASSESSMENT & PLAN    Diagnoses and all orders for this visit:    1. Leg pain, anterior, right (Primary)  -     XR Tibia Fibula 2 View Right; Future    2. Lymphadenopathy  -     US Head Neck Soft Tissue; Future    3. Easy bruising  -     CBC & Differential; Future  -     Vitamin B12; Future  -     Iron Profile; Future    4. Other fatigue  -     CBC & Differential; Future  -     Comprehensive Metabolic Panel; Future  -     TSH; Future  -     T4, Free; Future  -     Vitamin B12; Future  -     Iron Profile; Future    5. Vesicular lesion  Assessment & Plan:  Discussed with patient that the lesion on her lip at present appears most consistent with a fever blister Cozaar associated with HSV, we will check for HSV titers, discussed that the sores she described in her mouth sounds most like canker sores/ apthous lesions, discussed that her illness with COVID may have been a trigger for these, we will obtain labs for further evaluation, no lesions inside the mouth currently     Orders:  -     HSV 1 & 2 - Specific Antibody, IgG; Future       Tobacco Use: Low Risk    • Smoking Tobacco Use: Never   • Smokeless Tobacco Use: Never   • Passive Exposure: Not on file       Follow Up     Return if symptoms worsen or fail to improve.        Patient was given instructions and counseling regarding her condition or for health maintenance advice. Please see specific information pulled into the AVS if appropriate.   I have reviewed information obtained and documented by others and I have confirmed the accuracy of this documented note.    RADHA Ledezma

## 2023-01-17 NOTE — ASSESSMENT & PLAN NOTE
Discussed with patient that the lesion on her lip at present appears most consistent with a fever blister Cozaar associated with HSV, we will check for HSV titers, discussed that the sores she described in her mouth sounds most like canker sores/ apthous lesions, discussed that her illness with COVID may have been a trigger for these, we will obtain labs for further evaluation, no lesions inside the mouth currently

## 2023-01-18 LAB
HSV1 IGG SER IA-ACNC: 56.9 INDEX (ref 0–0.9)
HSV2 IGG SER IA-ACNC: <0.91 INDEX (ref 0–0.9)

## 2023-08-28 ENCOUNTER — TELEPHONE (OUTPATIENT)
Dept: OBSTETRICS AND GYNECOLOGY | Facility: CLINIC | Age: 38
End: 2023-08-28

## 2023-08-28 NOTE — TELEPHONE ENCOUNTER
PROVIDER: DR. MICHELLE    Caller: Shelby Jon    Relationship to patient: Self    Best call back number: [phone]    Chief complaint: NEW OB - LMP IS POSS 7-4 (7W 6 DAYS) I HAVE NO APPTS BUT AUG 30 WITH ALEIDA MALLOY AT OFFICE, NOT SURE IF I COULD TAKE THAT SPOT OR NOT.    Type of visit: NEW OB    Requested date: SHE IS ALREADY 7 WKS 6 DAYS     If rescheduling, when is the original appointment:      Additional notes:

## 2023-08-30 ENCOUNTER — INITIAL PRENATAL (OUTPATIENT)
Dept: OBSTETRICS AND GYNECOLOGY | Facility: CLINIC | Age: 38
End: 2023-08-30
Payer: COMMERCIAL

## 2023-08-30 VITALS — SYSTOLIC BLOOD PRESSURE: 124 MMHG | WEIGHT: 168 LBS | DIASTOLIC BLOOD PRESSURE: 75 MMHG | BODY MASS INDEX: 29.76 KG/M2

## 2023-08-30 DIAGNOSIS — Z13.89 SCREENING FOR BLOOD OR PROTEIN IN URINE: ICD-10-CM

## 2023-08-30 DIAGNOSIS — Z34.80 SUPERVISION OF OTHER NORMAL PREGNANCY, ANTEPARTUM: Primary | ICD-10-CM

## 2023-08-30 DIAGNOSIS — Z98.891 PREVIOUS CESAREAN SECTION: ICD-10-CM

## 2023-08-30 DIAGNOSIS — O09.529 ANTEPARTUM MULTIGRAVIDA OF ADVANCED MATERNAL AGE: ICD-10-CM

## 2023-08-30 LAB
ABO GROUP BLD: NORMAL
B-HCG UR QL: POSITIVE
BASOPHILS # BLD AUTO: 0.06 10*3/MM3 (ref 0–0.2)
BASOPHILS NFR BLD AUTO: 0.9 % (ref 0–1.5)
BLD GP AB SCN SERPL QL: NEGATIVE
DEPRECATED RDW RBC AUTO: 40.5 FL (ref 37–54)
EOSINOPHIL # BLD AUTO: 0.25 10*3/MM3 (ref 0–0.4)
EOSINOPHIL NFR BLD AUTO: 3.9 % (ref 0.3–6.2)
ERYTHROCYTE [DISTWIDTH] IN BLOOD BY AUTOMATED COUNT: 12.1 % (ref 12.3–15.4)
EXPIRATION DATE: ABNORMAL
GLUCOSE UR STRIP-MCNC: NEGATIVE MG/DL
HBV SURFACE AG SERPL QL IA: NORMAL
HCG INTACT+B SERPL-ACNC: 346 MIU/ML
HCT VFR BLD AUTO: 42.7 % (ref 34–46.6)
HCV AB SER DONR QL: NORMAL
HGB BLD-MCNC: 14.3 G/DL (ref 12–15.9)
HIV1+2 AB SER QL: NORMAL
IMM GRANULOCYTES # BLD AUTO: 0.02 10*3/MM3 (ref 0–0.05)
IMM GRANULOCYTES NFR BLD AUTO: 0.3 % (ref 0–0.5)
INTERNAL NEGATIVE CONTROL: NEGATIVE
INTERNAL POSITIVE CONTROL: ABNORMAL
LYMPHOCYTES # BLD AUTO: 2.27 10*3/MM3 (ref 0.7–3.1)
LYMPHOCYTES NFR BLD AUTO: 35.9 % (ref 19.6–45.3)
Lab: ABNORMAL
MCH RBC QN AUTO: 30.6 PG (ref 26.6–33)
MCHC RBC AUTO-ENTMCNC: 33.5 G/DL (ref 31.5–35.7)
MCV RBC AUTO: 91.2 FL (ref 79–97)
MONOCYTES # BLD AUTO: 0.36 10*3/MM3 (ref 0.1–0.9)
MONOCYTES NFR BLD AUTO: 5.7 % (ref 5–12)
NEUTROPHILS NFR BLD AUTO: 3.37 10*3/MM3 (ref 1.7–7)
NEUTROPHILS NFR BLD AUTO: 53.3 % (ref 42.7–76)
NRBC BLD AUTO-RTO: 0 /100 WBC (ref 0–0.2)
PLATELET # BLD AUTO: 309 10*3/MM3 (ref 140–450)
PMV BLD AUTO: 10.6 FL (ref 6–12)
PROT UR STRIP-MCNC: NEGATIVE MG/DL
RBC # BLD AUTO: 4.68 10*6/MM3 (ref 3.77–5.28)
RH BLD: POSITIVE
T PALLIDUM IGG SER QL: NORMAL
WBC NRBC COR # BLD: 6.33 10*3/MM3 (ref 3.4–10.8)

## 2023-08-30 PROCEDURE — 87086 URINE CULTURE/COLONY COUNT: CPT | Performed by: NURSE PRACTITIONER

## 2023-08-30 PROCEDURE — 86900 BLOOD TYPING SEROLOGIC ABO: CPT | Performed by: NURSE PRACTITIONER

## 2023-08-30 PROCEDURE — 85025 COMPLETE CBC W/AUTO DIFF WBC: CPT | Performed by: NURSE PRACTITIONER

## 2023-08-30 PROCEDURE — 86803 HEPATITIS C AB TEST: CPT | Performed by: NURSE PRACTITIONER

## 2023-08-30 PROCEDURE — 86780 TREPONEMA PALLIDUM: CPT | Performed by: NURSE PRACTITIONER

## 2023-08-30 PROCEDURE — 87340 HEPATITIS B SURFACE AG IA: CPT | Performed by: NURSE PRACTITIONER

## 2023-08-30 PROCEDURE — 86901 BLOOD TYPING SEROLOGIC RH(D): CPT | Performed by: NURSE PRACTITIONER

## 2023-08-30 PROCEDURE — 86850 RBC ANTIBODY SCREEN: CPT | Performed by: NURSE PRACTITIONER

## 2023-08-30 PROCEDURE — G0432 EIA HIV-1/HIV-2 SCREEN: HCPCS | Performed by: NURSE PRACTITIONER

## 2023-08-30 PROCEDURE — 84702 CHORIONIC GONADOTROPIN TEST: CPT | Performed by: NURSE PRACTITIONER

## 2023-08-30 NOTE — PROGRESS NOTES
OB Initial Visit    CC- Here for care of current pregnancy, first visit    Subjective:  38 y.o.  presenting for her first obstetrical visit.    LMP: Patient's last menstrual period was 2023.     Pt has no complaints, is doing well    Unsure of LMP, first positive home test on 23.    Reviewed and updated:  OBHx, GYNHx (STDs), PMHx, Medications, Allergies, PSHx, Social Hx, Preventative Hx (PAP), Hx of abuse/safe environment, Vaccine Hx including hx of chickenpox or vaccine, Genetic Hx (pt, FOB, both families).        Objective:  /75   Wt 76.2 kg (168 lb)   LMP 2023   BMI 29.76 kg/mý      Urine pregnancy test is positive     General- NAD, alert and oriented, appropriate  Psych- Normal mood, good memory  Neck- No masses, no thyroid enlargement  CV- Regular rhythm, no murnurs  Resp- CTA to bases, no wheezes  Abdomen- Soft, non distended, non tender, no masses    Breast left- deferred  Breast right- deferred    External genitalia- Normal, no lesions  Urethra- Normal, no masses, non tender  Vagina- Normal, no discharge  Bladder- Normal, no masses, non tender  Cvx- Normal, no lesions, no discharge, no CMT  Uterus- Normal shape and consistency, non tender, Size less than dates, unable to visualize IUP on brief TAS .    Adnexa- Normal, no mass, non tender    Lymphatic- No palpable neck, axillary, or groin nodes  Ext- No edema, no cyanosis    Skin- No lesions, no rashes, no acanthosis nigricans    Assessment and Plan:  8w1d  Diagnoses and all orders for this visit:    1. Supervision of other normal pregnancy, antepartum (Primary)  Overview:  AZEB finalized: 24 per unsure LMP, dating scan ordered, size less than dates    Genetic testing (NIPS-Quad)/CF/AFP:  Discussed all, patient is considering    COVID: Recommended 23  Flu: Recommended 23  Tdap:    Rhogam:  ? Desires Sterilization:    Anatomy US:  FU US:    PROBLEM LIST/PLAN:   AMA - Plan NSTs beginning 32-36 weeks    Previous   -  C/S times two, desires repeat        Orders:  -     POC Urinalysis Dipstick  -     OB Panel With HIV  -     Urine Culture - Urine, Urine, Random Void  -     IGP,CtNgTv,Apt HPV,rfx 16 / 18,45  -     Hemoglobinopathy Fractionation Meigs  -     POC Pregnancy, Urine  -     hCG, Quantitative, Pregnancy  -     US Ob Transvaginal; Future    2. Screening for blood or protein in urine  -     POC Urinalysis Dipstick    3. Antepartum multigravida of advanced maternal age  Overview:  Plan NSTs between 32-36 weeks      4. Previous  section  Overview:  Previous  times two, size less than dates          Genetic Screening:   Considering   NIPS  AFP only    Vaccines:   Recommend FLU vaccine this season, R/B discussed  Recommend COVID vaccine, R/B discussed    Counseling:   Nutrition discussed, calories, activity/exercise in pregnancy  Discussed dietary restrictions/safety food preparation in pregnancy  Reviewed what to expect prenatal visits, office providers and [facility] hospitalists  Appropriate trimester precautions provided, N/V, vag bleeding, cramping  Questions answered    Labs:   Prenatal labs, cultures, and PAP performed (prn), [facility]    Return in about 4 weeks (around 2023) for [facility] Office, Dr. Santamaria, OB follow up.      Brigido Delaney, APRN  2023    [facility] [place]  [facility]  [address] [place] [address]  Dept: [phone]  Dept Fax: [phone]  Loc: [phone]

## 2023-08-31 ENCOUNTER — TELEPHONE (OUTPATIENT)
Dept: OBSTETRICS AND GYNECOLOGY | Facility: CLINIC | Age: 38
End: 2023-08-31
Payer: COMMERCIAL

## 2023-08-31 DIAGNOSIS — Z34.80 SUPERVISION OF OTHER NORMAL PREGNANCY, ANTEPARTUM: Primary | ICD-10-CM

## 2023-08-31 LAB
BACTERIA SPEC AEROBE CULT: NORMAL
HGB A MFR BLD ELPH: 97.4 % (ref 96.4–98.8)
HGB A2 MFR BLD ELPH: 2.6 % (ref 1.8–3.2)
HGB F MFR BLD ELPH: 0 % (ref 0–2)
HGB FRACT BLD-IMP: NORMAL
HGB S MFR BLD ELPH: 0 %
RUBV IGG SERPL IA-ACNC: 4.32 INDEX

## 2023-08-31 NOTE — TELEPHONE ENCOUNTER
Called patient aware of information patient is going to go to one of the  facility's tomorrow to have drawn.

## 2023-08-31 NOTE — TELEPHONE ENCOUNTER
----- Message from RADHA Pineda sent at 8/31/2023 10:35 AM EDT -----  Please let patient know her beta HCG is 346, indicates 4-5 weeks gestation.  Recommend repeat beta HCG tomorrow to ensure it is rising appropriately.  I will place order.

## 2023-09-01 ENCOUNTER — LAB (OUTPATIENT)
Dept: LAB | Facility: HOSPITAL | Age: 38
End: 2023-09-01
Payer: COMMERCIAL

## 2023-09-01 DIAGNOSIS — Z34.80 SUPERVISION OF OTHER NORMAL PREGNANCY, ANTEPARTUM: ICD-10-CM

## 2023-09-01 LAB — HCG INTACT+B SERPL-ACNC: 800 MIU/ML

## 2023-09-01 PROCEDURE — 36415 COLL VENOUS BLD VENIPUNCTURE: CPT

## 2023-09-01 PROCEDURE — 84702 CHORIONIC GONADOTROPIN TEST: CPT

## 2023-09-05 ENCOUNTER — TELEPHONE (OUTPATIENT)
Dept: OBSTETRICS AND GYNECOLOGY | Facility: CLINIC | Age: 38
End: 2023-09-05
Payer: COMMERCIAL

## 2023-09-05 LAB
C TRACH RRNA CVX QL NAA+PROBE: NEGATIVE
CYTOLOGIST CVX/VAG CYTO: NORMAL
CYTOLOGY CVX/VAG DOC CYTO: NORMAL
CYTOLOGY CVX/VAG DOC THIN PREP: NORMAL
DX ICD CODE: NORMAL
HIV 1 & 2 AB SER-IMP: NORMAL
HPV GENOTYPE REFLEX: NORMAL
HPV I/H RISK 4 DNA CVX QL PROBE+SIG AMP: NEGATIVE
Lab: NORMAL
N GONORRHOEA RRNA CVX QL NAA+PROBE: NEGATIVE
OTHER STN SPEC: NORMAL
RECOM F/U CVX/VAG CYTO: NORMAL
STAT OF ADQ CVX/VAG CYTO-IMP: NORMAL
T VAGINALIS RRNA SPEC QL NAA+PROBE: NEGATIVE

## 2023-09-05 NOTE — TELEPHONE ENCOUNTER
----- Message from RADHA Pineda sent at 9/5/2023 12:47 PM EDT -----  Please let patient know her pap was unsatisfactory for evaluation due to not enough cells, HPV was negative.  Will plan recollect after delivery.

## 2023-09-05 NOTE — TELEPHONE ENCOUNTER
----- Message from RADHA Pineda sent at 9/5/2023  8:48 AM EDT -----  Please let patient know her beta HCG level stephanie appropriately to 800.  Recommend she keep scheduled appointments for ultrasound and follow up.  Please review bleeding precautions.

## 2023-09-21 NOTE — PROGRESS NOTES
OB FOLLOW UP      Chief Complaint   Patient presents with    Routine Prenatal Visit     Subjective:   Some nausea and food aversions  GERD  One dizzy episode    Objective:  /85   Wt 77.6 kg (171 lb)   LMP 2023   BMI 30.29 kg/m²  3.629 kg (8 lb) Facility age limit for growth percentiles is 20 years.  See OB flow sheet for fundal height (not performed if US day of OV), FHT, edema, cvx exam if performed, and Upro/Uglu      Assessment and Plan:  12w3d   Reassuring pregnancy progress.  Questions answered.  Diagnoses and all orders for this visit:    1. Supervision of other normal pregnancy, antepartum (Primary)  Overview:  AZEB finalized: 24 by 8-week US, changed by LMP    Genetic testing (NIPS-Quad)/CF/AFP:  2023 considering NIPS, AFP and CF/SMA    COVID: Recommended   Flu: Recommended   Tdap:  RSV:    ? Desires Sterilization:    Anatomy US:  FU US:    PROBLEM LIST/PLAN:   CHTN-  AMA - 39yo  Third trimester ultrasound for growth   Consider NSTs third tri  Consiering MFM US for anatomy  Previous  -  C/S times two, plan repeat    Unsatisfactory pap- HPV was neg, consider repeat pap PP        Orders:  -     POC Urinalysis Dipstick    2. Gastroesophageal reflux disease without esophagitis    3. Chronic hypertension  Overview:  Start baby ASA at 12weeks  BP cuff, check BP at home, goal 130/80  2023 Labetalol 100mg BID and baseline labs    Orders:  -     labetalol (NORMODYNE) 100 MG tablet; Take 1 tablet by mouth 2 (Two) Times a Day.  Dispense: 60 tablet; Refill: 0  -     Blood Pressure Device  -     Comprehensive Metabolic Panel  -     Protein / Creatinine Ratio, Urine - Urine, Clean Catch      Counseling:    First trimester precautions, bleeding, cramping, nausea and vomiting  HTN precautions reviewed: HA, vision change, RUQ/epigastric pain, edema  DIZZINESS/LIGHTHEADEDNESS in pregnancy reviewed.  Recommend small frequent meals, high in protein, staying hydrated, avoid extremes in heat  or prolonged standing/sitting, avoid hot showers and/or rising quickly from lying/sitting to standing.   GERD in pregnancy discussed.  Recommend smaller meals, avoid lying down at least 2hrs after meals, and avoid foods that trigger it (commonly highly seasoned foods, pizza, italian, mexican etc).  OTC Tums safe.  If using them regularly recommend other medication options that can be prescribed.   VACCINE importance in pregnancy discussed.  Maternal and fetal risk of not being vaccinated reviewed NLT increased risk maternal/fetal severity of illness/death, PTD, CS, hemorrhage, HTN, possible IUFD.  Significant maternal and fetal/infant benefit w vaccination.  FDA approval and ACOG/SMFM/CDC strong recommendation in pregnancy.  Questions answered.     Continue PNV.  Importance of healthy eating, obtaining sufficient sleep, and staying active unless hypertensive- activity modified as directed.    Return in about 4 weeks (around 10/27/2023) for FU OB x2.  Send in BP over MyChart over the next week.            Whitney Santamaria,   09/29/2023    [facility] [place] [facility]  [address] [place] [address]  Dept: [phone]  Dept Fax: [phone]  Loc: [phone]  Loc Fax: [phone]

## 2023-09-25 ENCOUNTER — HOSPITAL ENCOUNTER (OUTPATIENT)
Dept: ULTRASOUND IMAGING | Facility: HOSPITAL | Age: 38
Discharge: HOME | End: 2023-09-25
Admitting: NURSE PRACTITIONER
Payer: COMMERCIAL

## 2023-09-25 DIAGNOSIS — Z34.80 SUPERVISION OF OTHER NORMAL PREGNANCY, ANTEPARTUM: ICD-10-CM

## 2023-09-25 PROCEDURE — 76817 TRANSVAGINAL US OBSTETRIC: CPT

## 2023-09-26 ENCOUNTER — TELEPHONE (OUTPATIENT)
Dept: OBSTETRICS AND GYNECOLOGY | Facility: CLINIC | Age: 38
End: 2023-09-26
Payer: COMMERCIAL

## 2023-09-26 NOTE — TELEPHONE ENCOUNTER
----- Message from RADHA Pineda sent at 9/26/2023 10:06 AM EDT -----  Please let patient know her ultrasound shows a viable IUP which measured 8w2d.  This changes her AZEB to 5/4/24.  Heart rate was 165.  Recommend she keep her next scheduled appointment.

## 2023-09-26 NOTE — TELEPHONE ENCOUNTER
I have called patient left detailed message regarding results and to contact the office if have any questions.also sent information to patient my chart.

## 2023-09-29 ENCOUNTER — ROUTINE PRENATAL (OUTPATIENT)
Dept: OBSTETRICS AND GYNECOLOGY | Facility: CLINIC | Age: 38
End: 2023-09-29
Payer: COMMERCIAL

## 2023-09-29 VITALS — DIASTOLIC BLOOD PRESSURE: 85 MMHG | BODY MASS INDEX: 30.29 KG/M2 | WEIGHT: 171 LBS | SYSTOLIC BLOOD PRESSURE: 150 MMHG

## 2023-09-29 DIAGNOSIS — K21.9 GASTROESOPHAGEAL REFLUX DISEASE WITHOUT ESOPHAGITIS: ICD-10-CM

## 2023-09-29 DIAGNOSIS — Z34.80 SUPERVISION OF OTHER NORMAL PREGNANCY, ANTEPARTUM: Primary | ICD-10-CM

## 2023-09-29 DIAGNOSIS — I10 CHRONIC HYPERTENSION: ICD-10-CM

## 2023-09-29 LAB
CREAT UR-MCNC: 70.8 MG/DL
GLUCOSE UR STRIP-MCNC: NEGATIVE MG/DL
PROT ?TM UR-MCNC: 12 MG/DL
PROT UR STRIP-MCNC: NEGATIVE MG/DL
PROT/CREAT UR: 0.17 MG/G{CREAT}

## 2023-09-29 PROCEDURE — 80053 COMPREHEN METABOLIC PANEL: CPT | Performed by: OBSTETRICS & GYNECOLOGY

## 2023-09-29 PROCEDURE — 82570 ASSAY OF URINE CREATININE: CPT | Performed by: OBSTETRICS & GYNECOLOGY

## 2023-09-29 PROCEDURE — 84156 ASSAY OF PROTEIN URINE: CPT | Performed by: OBSTETRICS & GYNECOLOGY

## 2023-09-29 RX ORDER — PRENATAL VIT NO.126/IRON/FOLIC 28MG-0.8MG
TABLET ORAL DAILY
COMMUNITY

## 2023-09-29 RX ORDER — LABETALOL 100 MG/1
100 TABLET, FILM COATED ORAL 2 TIMES DAILY
Qty: 60 TABLET | Refills: 0 | Status: SHIPPED | OUTPATIENT
Start: 2023-09-29

## 2023-09-29 NOTE — PATIENT INSTRUCTIONS
Venipuncture Blood Specimen Collection  Venipuncture performed in right arm by Kerry Chávez with good hemostasis. Patient tolerated the procedure well without complications.   09/29/23   Kerry Chávez

## 2023-09-30 LAB
ALBUMIN SERPL-MCNC: 4.5 G/DL (ref 3.5–5.2)
ALBUMIN/GLOB SERPL: 1.4 G/DL
ALP SERPL-CCNC: 40 U/L (ref 39–117)
ALT SERPL W P-5'-P-CCNC: 14 U/L (ref 1–33)
ANION GAP SERPL CALCULATED.3IONS-SCNC: 12.1 MMOL/L (ref 5–15)
AST SERPL-CCNC: 18 U/L (ref 1–32)
BILIRUB SERPL-MCNC: <0.2 MG/DL (ref 0–1.2)
BUN SERPL-MCNC: 10 MG/DL (ref 6–20)
BUN/CREAT SERPL: 20.4 (ref 7–25)
CALCIUM SPEC-SCNC: 9.8 MG/DL (ref 8.6–10.5)
CHLORIDE SERPL-SCNC: 101 MMOL/L (ref 98–107)
CO2 SERPL-SCNC: 21.9 MMOL/L (ref 22–29)
CREAT SERPL-MCNC: 0.49 MG/DL (ref 0.57–1)
EGFRCR SERPLBLD CKD-EPI 2021: 124.7 ML/MIN/1.73
GLOBULIN UR ELPH-MCNC: 3.3 GM/DL
GLUCOSE SERPL-MCNC: 79 MG/DL (ref 65–99)
POTASSIUM SERPL-SCNC: 4.1 MMOL/L (ref 3.5–5.2)
PROT SERPL-MCNC: 7.8 G/DL (ref 6–8.5)
SODIUM SERPL-SCNC: 135 MMOL/L (ref 136–145)

## 2023-10-04 ENCOUNTER — CLINICAL SUPPORT (OUTPATIENT)
Dept: OBSTETRICS AND GYNECOLOGY | Facility: CLINIC | Age: 38
End: 2023-10-04
Payer: COMMERCIAL

## 2023-10-04 ENCOUNTER — TELEPHONE (OUTPATIENT)
Dept: OBSTETRICS AND GYNECOLOGY | Facility: CLINIC | Age: 38
End: 2023-10-04
Payer: COMMERCIAL

## 2023-10-04 VITALS — SYSTOLIC BLOOD PRESSURE: 120 MMHG | DIASTOLIC BLOOD PRESSURE: 78 MMHG

## 2023-10-04 DIAGNOSIS — I10 CHRONIC HYPERTENSION: Primary | ICD-10-CM

## 2023-10-04 DIAGNOSIS — O09.529 ANTEPARTUM MULTIGRAVIDA OF ADVANCED MATERNAL AGE: ICD-10-CM

## 2023-10-04 NOTE — TELEPHONE ENCOUNTER
Pt. Came in for BP check today. While here she mentioned that, starting this week, she has noticed that every day around 3pm she feels like she is having some labored breathing. She said that it does not last very long and if she lays down it helps it. She was wondering if this could be related to pregnancy hormones or possibly some anxiety about her BP's.   Any recommendations?

## 2023-10-04 NOTE — PROGRESS NOTES
Pt here for BP check and to compare manual BP to at home automatic cuff. Manual reading is 120/78. Automatic cuff reading is 125/83. Per Dr. ROBLES, patient advised to hold off on taking Labetalol for now and to continue to monitor her BP's at home a couple times per week and to bring the log of those BP's to her next visit. But if BP's are staying in the 140+/90+ to start the Labetalol.

## 2023-10-05 ENCOUNTER — TELEPHONE (OUTPATIENT)
Dept: OBSTETRICS AND GYNECOLOGY | Facility: CLINIC | Age: 38
End: 2023-10-05

## 2023-10-05 ENCOUNTER — TELEPHONE (OUTPATIENT)
Dept: OBSTETRICS AND GYNECOLOGY | Facility: CLINIC | Age: 38
End: 2023-10-05
Payer: COMMERCIAL

## 2023-10-05 NOTE — TELEPHONE ENCOUNTER
Hub staff attempted to follow warm transfer process and was unsuccessful     Caller: Shelby Jon    Relationship to patient: Self    Best call back number: [phone]    Patient is needing: PT RETURNING CALL TO OFFICE.

## 2023-10-05 NOTE — TELEPHONE ENCOUNTER
Called patient to let her know Dr. ROBLES's recommendations about her concern for labored breathing. Per Dr. ROBLES: Labored breathing can have many causes.  When it happens, I recommend she check her BP and pulse.  Ideal /80's, but may be higher w HTN.  HR in preg can range normally in 120's.  If Bps are high (S 155 or more or D 100 or more) OR if HR more than 120's she needs eval in office or hospital.  Once she turns 20weeks GA, her visits would be on L+D, before that she would be seen in ER.  IF she has CP or SOA not relieved w rest, coughing up blood or vomiting blood, she needs to be seen immediately in ER.  Anxiety can also cause it.  Staying hydrated, eating small freq high pro meals and getting outside, exercise, and other stress relievers can help.

## 2023-10-17 NOTE — PROGRESS NOTES
OB FOLLOW UP      Chief Complaint   Patient presents with    Routine Prenatal Visit     Subjective:   No complaints  BP at home daily since last OV wnl 110's/70's, never started labetalol  Had a while back some chest pressure, pain upper right side of chest, devon w inhaling or leaning over, resolved w lying down.  No more pain or pressure since.     Objective:  /82   Wt 78.9 kg (174 lb)   LMP 2023   BMI 30.82 kg/m²  4.99 kg (11 lb) Facility age limit for growth %nick is 20 years.  See OB flow sheet for fundal height (not performed if US day of OV), FHT, edema, cvx exam if performed, and Upro/Uglu      Assessment and Plan:  12w2d   Reassuring pregnancy progress.  Questions answered.  Diagnoses and all orders for this visit:    1. Supervision of other normal pregnancy, antepartum (Primary)  Overview:  AZEB finalized: 24 by 8-week US, changed by LMP    Genetic testing (NIPS-Quad)/CF/AFP:  10/23/2023 Declines NIPS, AFP and CF/SMA    COVID: Recommended   Flu: Recommended   Tdap:  RSV:    ? Desires Sterilization:    Anatomy US:  FU US:    PROBLEM LIST/PLAN:   CHTN- No meds, see separate  AMA - 39yo  Third trimester ultrasound for growth   Consider NSTs third tri  Declines MFM for anatomy  Previous  x2 -  Plan repeat    Unsatisfactory pap- HPV was neg, consider repeat pap PP    Orders:  -     POC Urinalysis Dipstick  -     US Ob Detail Fetal Anatomy Single or First Gestation; Future    2. Chronic hypertension  Overview:  No meds  Baseline labs wnl, UPC 0.17  Rx baby ASA at 12weeks 10/23/2023   BP cuff, check BP at home, goal 130/80    History:  2023 Labetalol 100mg BID  10/4/2023 Bps at home wnl, has not started labetalol    Orders:  -     aspirin 81 MG EC tablet; Take 1 tablet by mouth Daily. Start daily after 12weeks gestation and continue until 7-10 days before expected delivery  Dispense: 90 tablet; Refill: 1      Counseling:    OB precautions, leaking, VB, dianne dickey vs  PTL/Labor  FKC  We discussed that the discomfort and chest pressure she was having was most likely benign as it resolved spontaneously.  For chest pain or shortness of air that does not resolve spontaneously (and quickly) she needs to be seen immediately in the emergency room.  Questions were answered.  Discussed potential etiologies      Continue PNV.  Importance of healthy eating, obtaining sufficient sleep, and staying active unless hypertensive- activity modified as directed.    Return in about 4 weeks (around 11/20/2023) for FU OB x2, US anatomy 8weeks.            Whitney Santamaria, DO  10/23/2023    [facility] [place] [facility]  [address] [place] [address]  Dept: [phone]  Dept Fax: [phone]  Loc: [phone]  Loc Fax: [phone]

## 2023-10-23 ENCOUNTER — ROUTINE PRENATAL (OUTPATIENT)
Dept: OBSTETRICS AND GYNECOLOGY | Facility: CLINIC | Age: 38
End: 2023-10-23
Payer: COMMERCIAL

## 2023-10-23 ENCOUNTER — TELEPHONE (OUTPATIENT)
Dept: OBSTETRICS AND GYNECOLOGY | Facility: CLINIC | Age: 38
End: 2023-10-23
Payer: COMMERCIAL

## 2023-10-23 VITALS — WEIGHT: 174 LBS | SYSTOLIC BLOOD PRESSURE: 121 MMHG | DIASTOLIC BLOOD PRESSURE: 82 MMHG | BODY MASS INDEX: 30.82 KG/M2

## 2023-10-23 DIAGNOSIS — Z34.80 SUPERVISION OF OTHER NORMAL PREGNANCY, ANTEPARTUM: Primary | ICD-10-CM

## 2023-10-23 DIAGNOSIS — I10 CHRONIC HYPERTENSION: ICD-10-CM

## 2023-10-23 LAB
GLUCOSE UR STRIP-MCNC: NEGATIVE MG/DL
PROT UR STRIP-MCNC: NEGATIVE MG/DL

## 2023-10-23 PROCEDURE — 0502F SUBSEQUENT PRENATAL CARE: CPT | Performed by: OBSTETRICS & GYNECOLOGY

## 2023-10-23 RX ORDER — ASPIRIN 81 MG/1
81 TABLET ORAL DAILY
Qty: 90 TABLET | Refills: 1 | Status: SHIPPED | OUTPATIENT
Start: 2023-10-23

## 2023-11-16 NOTE — PROGRESS NOTES
OB FOLLOW UP      Chief Complaint   Patient presents with    Routine Prenatal Visit     Subjective:   No complaints    Objective:  /84   Wt 81.6 kg (180 lb)   LMP 2023   BMI 31.89 kg/m²  7.711 kg (17 lb) Facility age limit for growth %nick is 20 years.  See OB flow sheet for fundal height (not performed if US day of OV), FHT, edema, cvx exam if performed, and Upro/Uglu      Assessment and Plan:  16w2d   Reassuring pregnancy progress.  Questions answered.  Diagnoses and all orders for this visit:    1. Supervision of other normal pregnancy, antepartum (Primary)  Overview:  AZEB finalized: 24 by 8-week US, changed from LMP    Declined NIPS, AFP and CF/SMA    COVID: Recommended   Flu: Recommended   Tdap:    ? Desires Sterilization:    Anatomy US:  FU US:    PROBLEM LIST/PLAN:   CHTN- No meds, see separate  AMA - 37yo  Third trimester ultrasound for growth   Consider NSTs third tri  Declines MFM for anatomy  Previous  x2 -  Plan repeat    Unsatisfactory pap- HPV was neg, consider repeat pap PP    Orders:  -     POC Urinalysis Dipstick    2. Chronic hypertension  Overview:  No meds  Baseline labs wnl, UPC 0.17  Baby ASA- continue  BP cuff, check BP at home, goal 130/80.     History:  2023 Rx Labetalol 100mg BID  10/4/2023 Bps at home wnl, had not started labetalol  2023  BP at home highest 121/79, irreg HR on monitor occasionally, repeat checks wnl.  CV exam today, RRR full minute        Counseling:    Second trimester precautions  Discussed S/Sx to be seen immediately, eg CP, SOB, heart pounding or feeling irregular.  Reassurrance, exam today wnl    Continue PNV.  Importance of healthy eating, obtaining sufficient sleep, and staying active unless hypertensive- activity modified as directed.    Return in about 4 weeks (around 2023) for FU OB w anatomy US, the OV 4weeks later.            Whitney Santamaria, DO  2023    [facility] [place] [facility]  [address] [place] [address]  Dept: [phone]  Dept Fax: [phone]  Loc: [phone]  Loc Fax: [phone]

## 2023-11-20 ENCOUNTER — ROUTINE PRENATAL (OUTPATIENT)
Dept: OBSTETRICS AND GYNECOLOGY | Facility: CLINIC | Age: 38
End: 2023-11-20
Payer: COMMERCIAL

## 2023-11-20 ENCOUNTER — TELEPHONE (OUTPATIENT)
Dept: OBSTETRICS AND GYNECOLOGY | Facility: CLINIC | Age: 38
End: 2023-11-20
Payer: COMMERCIAL

## 2023-11-20 VITALS — WEIGHT: 180 LBS | SYSTOLIC BLOOD PRESSURE: 133 MMHG | BODY MASS INDEX: 31.89 KG/M2 | DIASTOLIC BLOOD PRESSURE: 84 MMHG

## 2023-11-20 DIAGNOSIS — Z34.80 SUPERVISION OF OTHER NORMAL PREGNANCY, ANTEPARTUM: Primary | ICD-10-CM

## 2023-11-20 DIAGNOSIS — I10 CHRONIC HYPERTENSION: ICD-10-CM

## 2023-11-20 LAB
GLUCOSE UR STRIP-MCNC: NEGATIVE MG/DL
PROT UR STRIP-MCNC: NEGATIVE MG/DL

## 2023-11-21 ENCOUNTER — TELEPHONE (OUTPATIENT)
Dept: OBSTETRICS AND GYNECOLOGY | Facility: CLINIC | Age: 38
End: 2023-11-21
Payer: COMMERCIAL

## 2023-11-24 ENCOUNTER — TELEPHONE (OUTPATIENT)
Dept: URGENT CARE | Facility: CLINIC | Age: 38
End: 2023-11-24

## 2023-11-24 PROCEDURE — 87807 RSV ASSAY W/OPTIC: CPT | Performed by: STUDENT IN AN ORGANIZED HEALTH CARE EDUCATION/TRAINING PROGRAM

## 2023-11-24 NOTE — TELEPHONE ENCOUNTER
Called, patient answered and confirmed her date of birth, relayed test result from RSV screening test, positive. Relayed that this means that she does have RSV, recommended isolation through middle of next week, patient expressed understanding and agreement to all the above. She reports no further questions at this time, stated she should call us back if she has any. Patient expressed understanding and agreement to all above.     -John Cooksey, PA-C

## 2023-12-12 NOTE — PROGRESS NOTES
OB FOLLOW UP      Chief Complaint   Patient presents with    Routine Prenatal Visit     Subjective:   No complaints    Objective:  /77   Wt 83.5 kg (184 lb)   LMP 2023   BMI 32.60 kg/m²  9.526 kg (21 lb) Facility age limit for growth %nick is 20 years.  See OB flow sheet for fundal height (not performed if US day of OV), FHT, edema, cvx exam if performed, and Upro/Uglu      Assessment and Plan:  21w5d     Diagnoses and all orders for this visit:    1. Supervision of other normal pregnancy, antepartum (Primary)  Overview:  AZEB finalized: 24 by 8-week US, changed from LMP    Declined NIPS, AFP and CF/SMA    COVID: Recommended   Flu: Recommended   Tdap:    ? Desires Sterilization:    Anatomy US: 23 consistent w dates,  posterior placenta previa, breech, wnl completed, male  FU US:    PROBLEM LIST/PLAN:   CHTN- No meds, see separate  Placenta Previa- pelvic rest, FU US 28-32weeks  AMA - 37yo  Third trimester ultrasound for growth   Consider NSTs third tri  Declines MFM for anatomy  Previous  x2 -  Plan repeat    Unsatisfactory pap- HPV was neg, consider repeat pap PP    Orders:  -     POC Urinalysis Dipstick    2. Chronic hypertension  Overview:  No meds  Baseline labs wnl, UPC 0.17  Baby ASA- continue  BP cuff, check BP at home, goal 130/80.     History:  2023 Rx Labetalol 100mg BID  10/4/2023 Bps at home wnl, had not started labetalol  2023  BP at home highest 121/79  2023 BP at home wnl        Counseling:    Second trimester precautions  HTN precautions reviewed: HA, vision change, RUQ/epigastric pain, edema  PLACENTA PREVIA discussed.  Reviewed the dx, risks of bleeding, importance immediate care PRN bleeding, strongly recommend NO vaginal penetration until resolved.  Questions answered to her satisfaction.      Reassuring pregnancy progress. Questions answered  Continue PNV.  Importance of healthy eating, obtaining sufficient sleep, and staying active unless  hypertensive- activity modified as directed.    Return in about 4 weeks (around 1/25/2024) for FU OB w glucola, then t3xnite x3.            Whitney Santamaria, DO  12/28/2023    [facility] [place] [facility]  [address] [place] [address]  Dept: [phone]  Dept Fax: [phone]  Loc: [phone]  Loc Fax: [phone]

## 2023-12-28 ENCOUNTER — ROUTINE PRENATAL (OUTPATIENT)
Dept: OBSTETRICS AND GYNECOLOGY | Facility: CLINIC | Age: 38
End: 2023-12-28
Payer: COMMERCIAL

## 2023-12-28 ENCOUNTER — TELEPHONE (OUTPATIENT)
Dept: OBSTETRICS AND GYNECOLOGY | Facility: CLINIC | Age: 38
End: 2023-12-28

## 2023-12-28 VITALS — BODY MASS INDEX: 32.6 KG/M2 | WEIGHT: 184 LBS | SYSTOLIC BLOOD PRESSURE: 114 MMHG | DIASTOLIC BLOOD PRESSURE: 77 MMHG

## 2023-12-28 DIAGNOSIS — I10 CHRONIC HYPERTENSION: ICD-10-CM

## 2023-12-28 DIAGNOSIS — Z34.80 SUPERVISION OF OTHER NORMAL PREGNANCY, ANTEPARTUM: Primary | ICD-10-CM

## 2023-12-28 PROBLEM — O44.00 PLACENTA PREVIA ANTEPARTUM: Status: ACTIVE | Noted: 2023-12-28

## 2023-12-28 LAB
GLUCOSE UR STRIP-MCNC: NEGATIVE MG/DL
PROT UR STRIP-MCNC: NEGATIVE MG/DL

## 2023-12-28 PROCEDURE — 0502F SUBSEQUENT PRENATAL CARE: CPT | Performed by: OBSTETRICS & GYNECOLOGY

## 2024-01-26 ENCOUNTER — ROUTINE PRENATAL (OUTPATIENT)
Dept: OBSTETRICS AND GYNECOLOGY | Facility: CLINIC | Age: 39
End: 2024-01-26
Payer: COMMERCIAL

## 2024-01-26 VITALS — DIASTOLIC BLOOD PRESSURE: 85 MMHG | BODY MASS INDEX: 33.49 KG/M2 | WEIGHT: 189 LBS | SYSTOLIC BLOOD PRESSURE: 126 MMHG

## 2024-01-26 DIAGNOSIS — I10 CHRONIC HYPERTENSION: ICD-10-CM

## 2024-01-26 DIAGNOSIS — Z98.891 PREVIOUS CESAREAN SECTION: ICD-10-CM

## 2024-01-26 DIAGNOSIS — Z34.80 SUPERVISION OF OTHER NORMAL PREGNANCY, ANTEPARTUM: Primary | ICD-10-CM

## 2024-01-26 DIAGNOSIS — O44.00 PLACENTA PREVIA ANTEPARTUM: ICD-10-CM

## 2024-01-26 DIAGNOSIS — O09.529 ANTEPARTUM MULTIGRAVIDA OF ADVANCED MATERNAL AGE: ICD-10-CM

## 2024-01-26 LAB
DEPRECATED RDW RBC AUTO: 40.5 FL (ref 37–54)
ERYTHROCYTE [DISTWIDTH] IN BLOOD BY AUTOMATED COUNT: 12.7 % (ref 12.3–15.4)
GLUCOSE 1H P GLC SERPL-MCNC: 113 MG/DL (ref 65–139)
GLUCOSE UR STRIP-MCNC: NEGATIVE MG/DL
HCT VFR BLD AUTO: 35.4 % (ref 34–46.6)
HGB BLD-MCNC: 12.4 G/DL (ref 12–15.9)
MCH RBC QN AUTO: 31.7 PG (ref 26.6–33)
MCHC RBC AUTO-ENTMCNC: 35 G/DL (ref 31.5–35.7)
MCV RBC AUTO: 90.5 FL (ref 79–97)
PLATELET # BLD AUTO: 242 10*3/MM3 (ref 140–450)
PMV BLD AUTO: 10.9 FL (ref 6–12)
PROT UR STRIP-MCNC: NEGATIVE MG/DL
RBC # BLD AUTO: 3.91 10*6/MM3 (ref 3.77–5.28)
WBC NRBC COR # BLD AUTO: 12.26 10*3/MM3 (ref 3.4–10.8)

## 2024-01-26 PROCEDURE — 85027 COMPLETE CBC AUTOMATED: CPT | Performed by: OBSTETRICS & GYNECOLOGY

## 2024-01-26 PROCEDURE — 82950 GLUCOSE TEST: CPT | Performed by: OBSTETRICS & GYNECOLOGY

## 2024-01-26 NOTE — PATIENT INSTRUCTIONS
Venipuncture Blood Specimen Collection  Venipuncture performed in left arm by Yue Lynn with good hemostasis. Patient tolerated the procedure well without complications.   01/26/24   Yue Lynn

## 2024-01-26 NOTE — PROGRESS NOTES
Routine Prenatal Visit     Subjective  Shelby Jon is a 38 y.o.  at 25w6d here for her routine OB visit.   She is taking her prenatal vitamins.Reports no loss of fluid or vaginal bleeding. Patient doing well without any complaints. Pregnancy is complicated by:     Patient Active Problem List   Diagnosis    Supervision of other normal pregnancy, antepartum    Antepartum multigravida of advanced maternal age    Previous  section    Chronic hypertension    Placenta previa antepartum         OB History    Para Term  AB Living   3 2 2     2   SAB IAB Ectopic Molar Multiple Live Births           0 2      # Outcome Date GA Lbr Del/2nd Weight Sex Delivery Anes PTL Lv   3 Current            2 Term 07/15/20 39w6d  3600 g (7 lb 15 oz) M CS-LTranv Spinal N [place]   1 Term 18 40w3d 07:55 / 04:13 3175 g (7 lb) M CS-LTranv EPI N [place]      Birth Comments: panda OR 1           ROS:   General ROS: negative for - chills or fatigue  Respiratory ROS: negative for - cough or hemoptysis  Cardiovascular ROS: negative for - chest pain or dyspnea on exertion  Genito-Urinary ROS: negative for  change in urinary stream, vaginal discharge   Musculoskeletal ROS: negative for - gait disturbance or joint pain  Dermatological ROS: negative for acne,  dry skin or itching    Objective  Physical Exam:   Vitals:    24 0922   BP: 126/85       Uterine Size: size equals dates  FHT: 110-160 BPM    General appearance - alert, well appearing, and in no distress  Mental status - alert, oriented to person, place, and time  Abdomen- Soft, Gravid uterus, non-tender to palpation  Musculoskeletal: negative for - gait disturbance or joint pain  Extremeties: negative swelling or cyanosis   Dermatological: negative rashes or skin lesions       Assessment/Plan:   Diagnoses and all orders for this visit:    1. Supervision of other normal pregnancy, antepartum (Primary)  -     Gestational Diabetes Screen 1 Hour  -     CBC (No  Diff)  -     POC Urinalysis Dipstick    2. Placenta previa antepartum  -     US Ob 14 + Weeks Single or First Gestation; Future    3. Chronic hypertension  Assessment & Plan:  No meds  Baseline labs wnl, UPC 0.17  Baby ASA- continue  BP cuff, check BP at home, goal 130/80.       4. Previous  section    5. Antepartum multigravida of advanced maternal age          Counseling:   OB precautions, leaking, VB, dianne dickey vs PTL/Labor  FKC  HTN precautions reviewed: HA, vision change, RUQ/epigastric pain, edema  Round Ligament Pain:  The uterus has several ligaments which provide support and keep the uterus in place. As the  uterus grows these ligaments are pulled and stretched which often causes sharp stabbing like pain in the inguinal area.   You may find a pregnancy support band helpful. Changing positions may also help. Yoga is a great way to cope with round ligament and low back pain in pregnancy.    Massage may also help with low back pain   Things to Consider at this Point in your Pregnancy:  Some women experience swelling in their feet during pregnancy. Compression stockings may help  Drink plenty of water and stay active   Make sure you are eating frequent small meals, nuts are a wonderful snack to keep with you            Return in about 4 weeks (around 2024) for Routine OB visit.      We have gone over prenatal care to include the timing and content of visits. I informed her how to contact the office and/or on call person in the event of any problems and encouraged her to do so when she feels it is necessary.  We then spent time answering her questions which she indicated were answered to her satisfaction.    Chitra Tobias DO  2024 09:39 EST

## 2024-01-26 NOTE — ASSESSMENT & PLAN NOTE
No meds  Baseline labs wnl, UPC 0.17  Baby ASA- continue  BP cuff, check BP at home, goal 130/80.

## 2024-01-31 ENCOUNTER — TELEPHONE (OUTPATIENT)
Dept: OBSTETRICS AND GYNECOLOGY | Facility: CLINIC | Age: 39
End: 2024-01-31
Payer: COMMERCIAL

## 2024-01-31 NOTE — TELEPHONE ENCOUNTER
Please tell patient to disregard - after reading Dr. Anderson last office note isn't due back in until 2/23/24 - can keep the 02/29 appointment as scheduled.  MP

## 2024-02-08 NOTE — PROGRESS NOTES
OB FOLLOW UP      Chief Complaint   Patient presents with    Routine Prenatal Visit     Subjective:   Pubic bone and inner thigh discomfort    Objective:  /84   Wt 89.1 kg (196 lb 6.4 oz)   LMP 2023   BMI 34.80 kg/m²  15.2 kg (33 lb 6.4 oz) Facility age limit for growth %nick is 20 years.  See OB flow sheet for fundal height (not performed if US day of OV), FHT, edema, cvx exam if performed, and Upro/Uglu  Chaperone present during pelvic exam if performed.      Assessment and Plan:  29w2d     Diagnoses and all orders for this visit:    1. Supervision of other normal pregnancy, antepartum (Primary)  Overview:  AZEB finalized: 24 by 8-week US, changed from LMP    Declined NIPS, AFP and CF/SMA    COVID: Recommended, declines   Flu: Vaccinated  Tdap:  Recommended, s/p Rx    ? Desires Sterilization: Declines 2024     Anatomy US: 23 consistent w dates,  posterior placenta previa, breech, wnl completed, male  FU US: 3/22/24 [facility] [place]    PROBLEM LIST/PLAN:   CHTN- No meds, see separate  Placenta Previa- pelvic rest, FU US 28-32weeks- scheduled  AMA - 39yo  Third trimester ultrasound for growth   Consider NSTs third tri  Declines MFM for anatomy  Previous  x2 -  Plan repeat    Unsatisfactory pap- HPV was neg, consider repeat pap PP    Orders:  -     POC Urinalysis Dipstick    2. Chronic hypertension  Overview:  No meds  Baseline labs wnl, UPC 0.17  Baby ASA- continue  BP cuff, check BP at home, goal 130/80. Check daily  Plan weekly NSTs at 32 weeks  Growth US scheduled    History:  2023 Rx Labetalol 100mg BID  10/4/2023 Bps at home wnl, had not started labetalol  2023  BP at home highest 121/79  2023 BP at home wnl  2024 BP at home wnl        Counseling:    OB precautions, leaking, VB, dianne dickey vs PTL/Labor  FKC  DIASTASIS PUBIS discussed.  Recommend conservative measures, using passive ROM, assistance w hip flexion using upper arms, pregnancy support belt low on  hips.  WEIGHT GAIN in pregnancy reviewed.  Healthy dietary choices (increasing PRO/vegetables, decreasing CHO/fats and fast food), monitoring portion sizes, and exercise were encouraged.  Importance of monitoring diet for a healthy pregnancy and healthy fetus/infant.  Patient had concerns today regarding possible accreta.  We reviewed that since her placenta is posterior, even though it is a possibility, it is a much less likely possibility since the placenta is not underlying her 2 prior  scars.  Reassurance provided, will keep follow-up ultrasound as is already scheduled for further evaluation.    Reassuring pregnancy progress. Questions answered  Continue PNV.  Importance of healthy eating, obtaining sufficient sleep, and staying active unless hypertensive- activity modified as directed.    Return in about 2 weeks (around 3/4/2024) for FU OB q2wks to 36weeks.            Whitney Santamaria,   2024    [facility] [place] [facility]  [address] [place] [address]  Dept: [phone]  Dept Fax: [phone]  Loc: [phone]  Loc Fax: [phone]

## 2024-02-19 ENCOUNTER — ROUTINE PRENATAL (OUTPATIENT)
Dept: OBSTETRICS AND GYNECOLOGY | Facility: CLINIC | Age: 39
End: 2024-02-19
Payer: COMMERCIAL

## 2024-02-19 VITALS — DIASTOLIC BLOOD PRESSURE: 84 MMHG | SYSTOLIC BLOOD PRESSURE: 135 MMHG | WEIGHT: 196.4 LBS | BODY MASS INDEX: 34.8 KG/M2

## 2024-02-19 DIAGNOSIS — Z34.80 SUPERVISION OF OTHER NORMAL PREGNANCY, ANTEPARTUM: Primary | ICD-10-CM

## 2024-02-19 DIAGNOSIS — I10 CHRONIC HYPERTENSION: ICD-10-CM

## 2024-02-19 LAB
GLUCOSE UR STRIP-MCNC: NEGATIVE MG/DL
PROT UR STRIP-MCNC: NEGATIVE MG/DL

## 2024-02-19 PROCEDURE — 0502F SUBSEQUENT PRENATAL CARE: CPT | Performed by: OBSTETRICS & GYNECOLOGY

## 2024-02-22 ENCOUNTER — TELEPHONE (OUTPATIENT)
Dept: OBSTETRICS AND GYNECOLOGY | Facility: CLINIC | Age: 39
End: 2024-02-22
Payer: COMMERCIAL

## 2024-02-22 NOTE — TELEPHONE ENCOUNTER
IN ADDITION TO LAST ENCOUNTER:  The patient has an appointment week of 03/08/24 - so 03/15/24 would be too close for another follow up........... If the patient can do 03/21/2024 - we could move her  03 22 24 (OB US and OB follow up)  to the day before - on 03/21, as well.

## 2024-02-22 NOTE — TELEPHONE ENCOUNTER
2/22/24 04:29pm left message for pt to call back & reschedule the OB Follow up - due, to Dr. ROBLES is out office.     HUB:  on 03 22 2024 The patient is scheduled for an OB Ultrasound the same day; before the OB Follow up - Dr. Santamaria has 03 15 2024 held for the 03/22/24 reschedules.  You may transfer the patient to the office, for Edy or Cindy VILLARREAL to reschedule both appointments to 03 15 2024 (if she wants to have them both on the same day).      (OR) The patient may keep the Ultrasound, on 03 22 2024 & just reschedule the 03 22 2024 OB Follow up to a different day.

## 2024-03-11 NOTE — PROGRESS NOTES
OB FOLLOW UP      Chief Complaint   Patient presents with    Routine Prenatal Visit     Subjective:   No complaints    Objective:  /76   Wt 89.7 kg (197 lb 12.8 oz)   LMP 2023   BMI 35.05 kg/m²  15.8 kg (34 lb 12.8 oz) Facility age limit for growth %nick is 20 years.  See OB flow sheet for fundal height (not performed if US day of OV), FHT, edema, cvx exam if performed, and Upro/Uglu  Chaperone present during pelvic exam if performed.      Assessment and Plan:  32w6d     Diagnoses and all orders for this visit:    1. Supervision of other normal pregnancy, antepartum (Primary)  Overview:  AZEB finalized: 24 by 8-week US, changed from LMP    Declined NIPS, AFP and CF/SMA    COVID: Recommended, declines   Flu: Vaccinated  Tdap:  Recommended, s/p Rx    ? Desires Sterilization: Declines 2024     Anatomy US: 23 consistent w dates,  posterior placenta previa, breech, wnl completed, male  FU US: 3/15/24 [facility] 5#9oz 91%, AC >99%, RADHA 17, VTX, low lying placenta 1.8    PROBLEM LIST/PLAN:   CHTN- No meds, see separate  Low Lying placenta- previa resolved- ok to resume sex  AMA - 39yo  Third trimester ultrasound for growth   NSTs third tri q week scheduled 3/15/2024   Declines MFM for anatomy  Previous  x2 -  Plan repeat   30April 40+3 RCS AP kangaroo in [place]    Unsatisfactory pap- HPV was neg, consider repeat pap PP    Orders:  -     POC Urinalysis Dipstick  -     US Ob 14 + Weeks Single or First Gestation; Future    2. Chronic hypertension  Overview:  No meds  Baseline labs wnl, UPC 0.17  Baby ASA- continue  BP cuff, check BP at home, goal 130/80. Check daily   3/15/2024 BP at home 130's max/80's  Plan weekly NSTs at 32 weeks- scheduled 3/15/2024   Growth US scheduled    History:  2023 Rx Labetalol 100mg BID.  Bps at home wnl, had not started labetalol    Orders:  -     US Ob 14 + Weeks Single or First Gestation; Future  -     Fetal Nonstress Test; Standing      Counseling:    OB  precautions, leaking, VB, dianne dickey vs PTL/Labor  FKC  CS scheduled      Reassuring pregnancy progress. Questions answered  Continue PNV.  Importance of healthy eating, obtaining sufficient sleep, and staying active unless hypertensive- activity modified as directed.    Return in about 2 weeks (around 3/29/2024) for FU OB x2, then weekly to AZEB, Schedule NSTs weekly starting next week.            Whitney Santamaria, DO  03/15/2024    [facility] [place] [facility]  [address] [place] [address]  Dept: [phone]  Dept Fax: [phone]  Loc: [phone]  Loc Fax: [phone]

## 2024-03-15 ENCOUNTER — ROUTINE PRENATAL (OUTPATIENT)
Dept: OBSTETRICS AND GYNECOLOGY | Facility: CLINIC | Age: 39
End: 2024-03-15
Payer: COMMERCIAL

## 2024-03-15 VITALS — WEIGHT: 197.8 LBS | DIASTOLIC BLOOD PRESSURE: 76 MMHG | SYSTOLIC BLOOD PRESSURE: 123 MMHG | BODY MASS INDEX: 35.05 KG/M2

## 2024-03-15 DIAGNOSIS — I10 CHRONIC HYPERTENSION: ICD-10-CM

## 2024-03-15 DIAGNOSIS — Z34.80 SUPERVISION OF OTHER NORMAL PREGNANCY, ANTEPARTUM: Primary | ICD-10-CM

## 2024-03-15 LAB
GLUCOSE UR STRIP-MCNC: NEGATIVE MG/DL
PROT UR STRIP-MCNC: NEGATIVE MG/DL

## 2024-03-22 ENCOUNTER — HOSPITAL ENCOUNTER (OUTPATIENT)
Facility: HOSPITAL | Age: 39
Discharge: HOME | End: 2024-03-22
Attending: OBSTETRICS & GYNECOLOGY | Admitting: OBSTETRICS & GYNECOLOGY
Payer: COMMERCIAL

## 2024-03-22 VITALS — DIASTOLIC BLOOD PRESSURE: 68 MMHG | SYSTOLIC BLOOD PRESSURE: 117 MMHG | HEART RATE: 89 BPM

## 2024-03-22 PROCEDURE — 59025 FETAL NON-STRESS TEST: CPT

## 2024-03-22 NOTE — NON STRESS TEST
Obstetrical Non-stress Test Interpretation     Name:  Shelby Jon  MRN: [id number]    38 y.o. female  at 33w6d    Indication: obesity      Fetal Assessment  Fetal Movement: active  Fetal HR Assessment Method: external  Fetal HR (beats/min): 135  Fetal HR Baseline: normal range  Fetal HR Variability: moderate (amplitude range 6 to 25 bpm)  Fetal HR Accelerations: greater than/equal to 15 bpm, lasting at least 15 seconds  Fetal HR Decelerations: absent  Sinusoidal Pattern Present: absent  Fetal HR Tracing Category: Category I    /68   Pulse 89   LMP 2023     Reason for test: Other (Comment) (obesity)  Date of Test: 3/22/2024  Time frame of test: 7707-9396  RN NST Interpretation: Reactive      Jennifer Romo RN  3/22/2024  11:55 EDT

## 2024-03-23 NOTE — NON STRESS TEST
Obstetrical Non-stress Test Interpretation     Name:  Shelby Jon  MRN: [id number]    38 y.o. female  at 34w0d    Indication: EGA 34 BMI 35; scheduled NST       Baseline: Normal 110-160 bpm  Variability:   Moderate/Normal (amplitude 6-25 bpm)  Accelerations: Present (32 weeks+) 15 x 15 bpm  Decelerations: Absent   Contractions:  Absent       Impression:  Category I       Bianca Sloan MD  3/23/2024  00:05 EDT

## 2024-03-25 NOTE — PROGRESS NOTES
OB FOLLOW UP      Chief Complaint   Patient presents with    Routine Prenatal Visit     Subjective:   BP at home wnl, highest 130/80  Questions need for BS check due to large AC on US last OV.  Random BS today- 93    Objective:  /72   Wt 90.7 kg (200 lb)   LMP 2023   BMI 35.44 kg/m²  16.8 kg (37 lb) Facility age limit for growth %nick is 20 years.  See OB flow sheet for fundal height (not performed if US day of OV), FHT, edema, cvx exam if performed, and Upro/Uglu  Chaperone present during pelvic exam if performed.      Assessment and Plan:  34w5d     Diagnoses and all orders for this visit:    1. Supervision of other normal pregnancy, antepartum (Primary)  Overview:  AZEB finalized: 24 by 8-week US, changed from LMP    Declined NIPS, AFP and CF/SMA    COVID: Recommended, declines   Flu: Vaccinated  Tdap:  Vaccinated    ? Desires Sterilization: Declined    Anatomy US: 23 consistent w dates,  posterior placenta previa, breech, wnl completed, male  FU US: 3/15/24 [facility] 5#9oz 91%, AC >99%, RADHA 17, VTX, low lying placenta 1.8  FU US: 24    PROBLEM LIST/PLAN:   CHTN- No meds, see separate  Low Lying placenta, posterior- previa resolved- ok to resume sex  AMA - 39yo  Third trimester ultrasound for growth   NSTs third tri q week Fri  Declines MFM for anatomy  Previous  x2 -  Plan repeat   30April 39+3 RCS AP kangaroo in [place]    Unsatisfactory pap- HPV was neg, consider repeat pap PP    Orders:  -     POC Urinalysis Dipstick    2. Chronic hypertension  Overview:  No meds  Baseline labs wnl, UPC 0.17  Baby ASA- continue  BP cuff, check BP at home, goal 130/80. Check daily  Plan weekly NSTs at 32 weeks- qFri  Growth US scheduled    History:  2023 Rx Labetalol 100mg BID.  Bps at home wnl, had not started labetalol        Counseling:    OB precautions, leaking, VB, dianne dickey vs PTL/Labor  FKC  HTN precautions reviewed: HA, vision change, RUQ/epigastric pain, edema  Reassurrance re nl  glucola and random BS today    Reassuring pregnancy progress. Questions answered  Continue PNV.  Importance of healthy eating, obtaining sufficient sleep, and staying active unless hypertensive- activity modified as directed.    Return in about 2 weeks (around 4/11/2024) for FU OB then weekly to AZEB.            Whitney Santamaria,   03/28/2024    [facility] [place] [facility]  [facility] [place] [address]  Dept: [phone]  Dept Fax: [phone]  Loc: [phone]  Loc Fax: [phone]

## 2024-03-28 ENCOUNTER — ROUTINE PRENATAL (OUTPATIENT)
Dept: OBSTETRICS AND GYNECOLOGY | Facility: CLINIC | Age: 39
End: 2024-03-28
Payer: COMMERCIAL

## 2024-03-28 VITALS — WEIGHT: 200 LBS | DIASTOLIC BLOOD PRESSURE: 72 MMHG | BODY MASS INDEX: 35.44 KG/M2 | SYSTOLIC BLOOD PRESSURE: 118 MMHG

## 2024-03-28 DIAGNOSIS — I10 CHRONIC HYPERTENSION: ICD-10-CM

## 2024-03-28 DIAGNOSIS — Z34.80 SUPERVISION OF OTHER NORMAL PREGNANCY, ANTEPARTUM: Primary | ICD-10-CM

## 2024-03-28 PROBLEM — O44.00 PLACENTA PREVIA ANTEPARTUM: Status: RESOLVED | Noted: 2023-12-28 | Resolved: 2024-03-28

## 2024-03-28 LAB
GLUCOSE UR STRIP-MCNC: NEGATIVE MG/DL
PROT UR STRIP-MCNC: NEGATIVE MG/DL

## 2024-03-28 PROCEDURE — 0502F SUBSEQUENT PRENATAL CARE: CPT | Performed by: OBSTETRICS & GYNECOLOGY

## 2024-03-29 ENCOUNTER — HOSPITAL ENCOUNTER (OUTPATIENT)
Facility: HOSPITAL | Age: 39
Discharge: HOME | End: 2024-03-29
Attending: OBSTETRICS & GYNECOLOGY | Admitting: OBSTETRICS & GYNECOLOGY
Payer: COMMERCIAL

## 2024-03-29 ENCOUNTER — HOSPITAL ENCOUNTER (OUTPATIENT)
Dept: LABOR AND DELIVERY | Facility: HOSPITAL | Age: 39
Discharge: HOME | End: 2024-03-29
Payer: COMMERCIAL

## 2024-03-29 VITALS — HEART RATE: 88 BPM | RESPIRATION RATE: 18 BRPM | SYSTOLIC BLOOD PRESSURE: 113 MMHG | DIASTOLIC BLOOD PRESSURE: 68 MMHG

## 2024-03-29 PROCEDURE — 59025 FETAL NON-STRESS TEST: CPT

## 2024-03-29 PROCEDURE — 59025 FETAL NON-STRESS TEST: CPT | Performed by: OBSTETRICS & GYNECOLOGY

## 2024-03-29 NOTE — NON STRESS TEST
Obstetrical Non-stress Test Interpretation     Name:  Shelby Jon  MRN: [id number]    38 y.o. female  at 34w6d    Indication: CHTN      Fetal Assessment  Fetal Movement: active  Fetal HR Assessment Method: external  Fetal HR (beats/min): 140  Fetal HR Baseline: normal range  Fetal HR Variability: moderate (amplitude range 6 to 25 bpm)  Fetal HR Accelerations: greater than/equal to 15 bpm, lasting at least 15 seconds  Fetal HR Decelerations: absent  Sinusoidal Pattern Present: absent    /68 (BP Location: Right arm)   Pulse 88   Resp 18   LMP 2023     Reason for test:  (CHTN)  Date of Test: 3/29/2024  Time frame of test: 4933-3473  RN NST Interpretation: Reactive      Latia Sutton RN  3/29/2024  15:38 EDT

## 2024-04-01 NOTE — PROGRESS NOTES
OB FOLLOW UP      Chief Complaint   Patient presents with    Routine Prenatal Visit     Subjective:   No complaints    Objective:  /79   Wt 92.5 kg (204 lb)   LMP 2023   BMI 36.15 kg/m²  18.6 kg (41 lb) Facility age limit for growth %nick is 20 years.  See OB flow sheet for fundal height (not performed if US day of OV), FHT, edema, cvx exam if performed, and Upro/Uglu  Chaperone present during pelvic exam if performed.  GBS RV swab performed today    Assessment and Plan:  36w3d     Diagnoses and all orders for this visit:    1. Supervision of other normal pregnancy, antepartum (Primary)  Overview:  AZEB finalized: 24 by 8-week US, changed from LMP    Declined NIPS, AFP and CF/SMA    COVID: Recommended, declines   Flu: Vaccinated  Tdap:  Vaccinated    ? Desires Sterilization: Declined    Anatomy US: 23 consistent w dates,  posterior placenta previa, breech, wnl completed, male  FU US: 3/15/24 [facility] 5#9oz 91%, AC >99%, RADHA 17, VTX, low lying placenta 1.8  FU US: 24 [facility] 7#14oz, 95%, AC >99%- nl glucola, RADHA 16.5, VTX, post fundal placenta    PROBLEM LIST/PLAN:   CHTN- No meds, see separate  AMA - 39yo  Third trimester ultrasound for growth - done  NSTs third tri q week Fri  Declines MFM for anatomy  Previous  x2 -  Plan repeat   30April 39+3 RCS AP kangaroo in [place]    Low Lying placenta, posterior- resolved   Unsatisfactory pap- HPV was neg, consider repeat pap PP    Orders:  -     POC Urinalysis Dipstick  -     Group B Strep (Molecular) - Swab, Vaginal/Rectum    2. Chronic hypertension  Overview:  No meds  Baseline labs wnl, UPC 0.17  Baby ASA- continue  BP cuff, check BP at home, goal 130/80. Check daily   2024 Bps wnl at home  Weekly NSTs at 32 weeks- qFri  Growth US -done    History:  2023 Rx Labetalol 100mg BID.  Bps at home wnl, had not started labetalol        Counseling:    OB precautions, leaking, VB, dianne dickey vs PTL/Labor  FKC    Reassuring pregnancy progress.  Questions answered.  Continue PNV.  Importance of healthy eating, obtaining sufficient sleep, and staying active unless hypertensive- activity modified as directed.    Return in about 1 week (around 4/16/2024) for FU OB q1wk to AZEB/Delivery.            Whitney Santamaria,   04/09/2024    [facility] [place] [facility]  [address] [place] [address]  Dept: [phone]  Dept Fax: [phone]  Loc: [phone]  Loc Fax: [phone]

## 2024-04-05 ENCOUNTER — HOSPITAL ENCOUNTER (OUTPATIENT)
Dept: LABOR AND DELIVERY | Facility: HOSPITAL | Age: 39
Discharge: HOME | End: 2024-04-05
Payer: COMMERCIAL

## 2024-04-05 ENCOUNTER — HOSPITAL ENCOUNTER (OUTPATIENT)
Facility: HOSPITAL | Age: 39
Discharge: HOME | End: 2024-04-05
Attending: OBSTETRICS & GYNECOLOGY | Admitting: OBSTETRICS & GYNECOLOGY
Payer: COMMERCIAL

## 2024-04-05 VITALS — RESPIRATION RATE: 18 BRPM | SYSTOLIC BLOOD PRESSURE: 122 MMHG | DIASTOLIC BLOOD PRESSURE: 81 MMHG | HEART RATE: 95 BPM

## 2024-04-05 PROCEDURE — 59025 FETAL NON-STRESS TEST: CPT

## 2024-04-05 PROCEDURE — 59025 FETAL NON-STRESS TEST: CPT | Performed by: OBSTETRICS & GYNECOLOGY

## 2024-04-05 PROCEDURE — G0463 HOSPITAL OUTPT CLINIC VISIT: HCPCS

## 2024-04-05 NOTE — NON STRESS TEST
Obstetrical Non-stress Test Interpretation     Name:  Shelby Jon  MRN: [id number]    38 y.o. female  at 35w6d    Indication: NST/CHTN      Fetal Assessment  Fetal Movement: active  Fetal HR Assessment Method: external  Fetal HR (beats/min): 150  Fetal HR Baseline: normal range  Fetal HR Variability: moderate (amplitude range 6 to 25 bpm)  Fetal HR Accelerations: lasting at least 15 seconds  Fetal HR Decelerations: absent    /81 (BP Location: Right arm)   Pulse 95   Resp 18   LMP 2023     Reason for test: OB Triage (NST/CHTN)  Date of Test: 2024  Time frame of test: 8064-2214  RN NST Interpretation: Reactive      Kathia Sue RN  2024  10:17 EDT

## 2024-04-06 NOTE — SIGNIFICANT NOTE
04/05/24 2227   Nonstress Test   Reason for NST Hypertension;Other (Comment)  (chronic hypertension, ama)   Acoustic Stimulator No   Uterine Irritability No   Contractions Irregular   Fetal Assessment   Fetal Movement active   Fetal HR Assessment Method external   Fetal HR (beats/min) 150  (150's)   Fetal HR Baseline normal range   Fetal HR Variability moderate (amplitude range 6 to 25 bpm)   Fetal HR Accelerations episodic;lasting at least 15 seconds;greater than/equal to 15 bpm   Fetal HR Decelerations absent   Sinusoidal Pattern Present absent   Fetal HR Tracing Category Category I   Interpretation A   Nonstress Test Interpretation A Reactive

## 2024-04-09 ENCOUNTER — TELEPHONE (OUTPATIENT)
Dept: OBSTETRICS AND GYNECOLOGY | Facility: CLINIC | Age: 39
End: 2024-04-09
Payer: COMMERCIAL

## 2024-04-09 ENCOUNTER — ROUTINE PRENATAL (OUTPATIENT)
Dept: OBSTETRICS AND GYNECOLOGY | Facility: CLINIC | Age: 39
End: 2024-04-09
Payer: COMMERCIAL

## 2024-04-09 VITALS — WEIGHT: 204 LBS | DIASTOLIC BLOOD PRESSURE: 79 MMHG | BODY MASS INDEX: 36.15 KG/M2 | SYSTOLIC BLOOD PRESSURE: 120 MMHG

## 2024-04-09 DIAGNOSIS — I10 CHRONIC HYPERTENSION: ICD-10-CM

## 2024-04-09 DIAGNOSIS — Z34.80 SUPERVISION OF OTHER NORMAL PREGNANCY, ANTEPARTUM: Primary | ICD-10-CM

## 2024-04-09 LAB
GLUCOSE UR STRIP-MCNC: NEGATIVE MG/DL
PROT UR STRIP-MCNC: NEGATIVE MG/DL

## 2024-04-09 PROCEDURE — 87653 STREP B DNA AMP PROBE: CPT | Performed by: OBSTETRICS & GYNECOLOGY

## 2024-04-09 PROCEDURE — 0502F SUBSEQUENT PRENATAL CARE: CPT | Performed by: OBSTETRICS & GYNECOLOGY

## 2024-04-10 LAB — GROUP B STREP, DNA: NEGATIVE

## 2024-04-12 ENCOUNTER — HOSPITAL ENCOUNTER (OUTPATIENT)
Dept: LABOR AND DELIVERY | Facility: HOSPITAL | Age: 39
Discharge: HOME | End: 2024-04-12
Payer: COMMERCIAL

## 2024-04-12 ENCOUNTER — HOSPITAL ENCOUNTER (OUTPATIENT)
Facility: HOSPITAL | Age: 39
Discharge: HOME | End: 2024-04-12
Attending: STUDENT IN AN ORGANIZED HEALTH CARE EDUCATION/TRAINING PROGRAM | Admitting: STUDENT IN AN ORGANIZED HEALTH CARE EDUCATION/TRAINING PROGRAM
Payer: COMMERCIAL

## 2024-04-12 VITALS
DIASTOLIC BLOOD PRESSURE: 65 MMHG | OXYGEN SATURATION: 97 % | SYSTOLIC BLOOD PRESSURE: 117 MMHG | HEART RATE: 85 BPM | RESPIRATION RATE: 18 BRPM

## 2024-04-12 PROCEDURE — 59025 FETAL NON-STRESS TEST: CPT

## 2024-04-12 NOTE — NON STRESS TEST
Obstetrical Non-stress Test Interpretation     Name:  Shelby Jon  MRN: [id number]    38 y.o. female  at 36w6d    Indication: chronic hypertension       Fetal Assessment  Fetal Movement: active  Fetal HR Assessment Method: external  Fetal HR (beats/min): 135  Fetal HR Baseline: normal range  Fetal HR Variability: moderate (amplitude range 6 to 25 bpm)  Fetal HR Accelerations: greater than/equal to 15 bpm, lasting at least 15 seconds  Fetal HR Decelerations: absent  Sinusoidal Pattern Present: absent    /65 (BP Location: Right arm)   Pulse 85   Resp 18   LMP 2023   SpO2 97%     Reason for test: Hypertension (chronic)  Date of Test: 2024  Time frame of test: 6042-6042  RN NST Interpretation: Reactive      Latia Sutton RN  2024  17:11 EDT

## 2024-04-15 NOTE — PROGRESS NOTES
OB FOLLOW UP      Chief Complaint   Patient presents with    Routine Prenatal Visit     Subjective:   Hemorrhoid  3weeks congestion, URI, hoarse, cough    Objective:  /79   Wt 92.1 kg (203 lb)   LMP 2023   BMI 35.97 kg/m²  18.1 kg (40 lb) Facility age limit for growth %nick is 20 years.  See OB flow sheet for fundal height (not performed if US day of OV), FHT, edema, cvx exam if performed, and Upro/Uglu  Chaperone present during pelvic exam if performed.      Assessment and Plan:  37w3d     Diagnoses and all orders for this visit:    1. Supervision of other normal pregnancy, antepartum (Primary)  Overview:  AZEB finalized: 24 by 8-week US, changed from LMP    Declined NIPS, AFP and CF/SMA    COVID: Recommended, declines   Flu: Vaccinated  Tdap:  Vaccinated    ? Desires Sterilization: Declined    Anatomy US: 23 consistent w dates,  posterior placenta previa, breech, wnl completed, male  FU US: 3/15/24 [facility] 5#9oz 91%, AC >99%, RADHA 17, VTX, low lying placenta 1.8  FU US: 24 [facility] 7#14oz, 95%, AC >99%- nl glucola, RADHA 16.5, VTX, post fundal placenta    PROBLEM LIST/PLAN:   CHTN- No meds, see separate  AMA - 37yo  Third trimester ultrasound for growth - done  NSTs third tri q week Fri  Declined MFM for anatomy  Previous  x2 -  Plan repeat   30April 39+3 RCS AP [place]ang[place]oo in [place]    Low Lying placenta, posterior- resolved   Unsatisfactory pap- HPV was neg, consider repeat pap PP    Orders:  -     POC Urinalysis Dipstick    2. Chronic hypertension  Overview:  No meds  Baseline labs wnl, UPC 0.17  Baby ASA- continue- plan to stop 23April  BP cuff, check BP at home, goal 130/80. Check daily   2024 Bps wnl at home  Weekly NSTs at 32 weeks- qFri  Growth US -done    History:  2023 Rx Labetalol 100mg BID.  Bps at home wnl, had not started labetalol        Counseling:    OB precautions, leaking, VB, dianne dickey vs PTL/Labor  FKC  Tucks and/or prepH, option Analpram, declines Rx for  now  Meds safe in preg wrt URI- option urgent care if thick green nasal drainage or sputum, temps.  Suspect viral + seasonal allergies by sxs.    Reassuring pregnancy progress. Questions answered.  Continue PNV.  Importance of healthy eating, obtaining sufficient sleep, and staying active unless hypertensive- activity modified as directed.    Return in about 1 week (around 4/23/2024) for FU OB.            Whitney Santamaria,   04/16/2024    [facility] [place] [facility] [place] [facility]  [address] [place] [address]  Dept: [phone]  Dept Fax: [phone]  Loc: [phone]  Loc Fax: [phone]

## 2024-04-16 ENCOUNTER — ROUTINE PRENATAL (OUTPATIENT)
Dept: OBSTETRICS AND GYNECOLOGY | Facility: CLINIC | Age: 39
End: 2024-04-16
Payer: COMMERCIAL

## 2024-04-16 VITALS — DIASTOLIC BLOOD PRESSURE: 79 MMHG | SYSTOLIC BLOOD PRESSURE: 113 MMHG | BODY MASS INDEX: 35.97 KG/M2 | WEIGHT: 203 LBS

## 2024-04-16 DIAGNOSIS — I10 CHRONIC HYPERTENSION: ICD-10-CM

## 2024-04-16 DIAGNOSIS — Z34.80 SUPERVISION OF OTHER NORMAL PREGNANCY, ANTEPARTUM: Primary | ICD-10-CM

## 2024-04-16 LAB
GLUCOSE UR STRIP-MCNC: NEGATIVE MG/DL
PROT UR STRIP-MCNC: NEGATIVE MG/DL

## 2024-04-16 PROCEDURE — 0502F SUBSEQUENT PRENATAL CARE: CPT | Performed by: OBSTETRICS & GYNECOLOGY

## 2024-04-19 ENCOUNTER — HOSPITAL ENCOUNTER (OUTPATIENT)
Dept: LABOR AND DELIVERY | Facility: HOSPITAL | Age: 39
Discharge: HOME | End: 2024-04-19
Payer: COMMERCIAL

## 2024-04-19 ENCOUNTER — HOSPITAL ENCOUNTER (OUTPATIENT)
Facility: HOSPITAL | Age: 39
Discharge: HOME | End: 2024-04-19
Attending: OBSTETRICS & GYNECOLOGY | Admitting: OBSTETRICS & GYNECOLOGY
Payer: COMMERCIAL

## 2024-04-19 VITALS — RESPIRATION RATE: 16 BRPM | HEART RATE: 85 BPM | SYSTOLIC BLOOD PRESSURE: 112 MMHG | DIASTOLIC BLOOD PRESSURE: 71 MMHG

## 2024-04-19 PROCEDURE — 59025 FETAL NON-STRESS TEST: CPT

## 2024-04-19 PROCEDURE — 59025 FETAL NON-STRESS TEST: CPT | Performed by: OBSTETRICS & GYNECOLOGY

## 2024-04-19 NOTE — NON STRESS TEST
Obstetrical Non-stress Test Interpretation     Name:  Shelby Jon  MRN: [id number]    38 y.o. female  at 37w6d    Indication: chtn      Fetal Movement: active  Fetal HR Assessment Method: external  Fetal HR (beats/min): 130  Fetal HR Baseline: normal range  Fetal HR Variability: moderate (amplitude range 6 to 25 bpm)  Fetal HR Accelerations: episodic, greater than/equal to 15 bpm, lasting at least 15 seconds  Fetal HR Decelerations: absent  Sinusoidal Pattern Present: absent    /71 (BP Location: Right arm, Patient Position: Sitting)   Pulse 85   Resp 16   LMP 2023     Reason for test: Hypertension  Date of Test: 2024  Time frame of test: 3222-1901  RN NST Interpretation: Reactive      Adry Reeder RN  2024  16:20 EDT

## 2024-04-22 NOTE — PROGRESS NOTES
OB FOLLOW UP      Chief Complaint   Patient presents with    Routine Prenatal Visit     Subjective:   No complaints  Denies HA, vision changes/scotomata, RUQ/epigastric pain or UE/facial edema    Objective:  /85   Wt 93.7 kg (206 lb 9.6 oz)   LMP 2023   BMI 36.61 kg/m²  19.8 kg (43 lb 9.6 oz) Facility age limit for growth %nick is 20 years.  See OB flow sheet for fundal height (not performed if US day of OV), FHT, edema, cvx exam if performed, and Upro/Uglu  Chaperone present during pelvic exam if performed.      Assessment and Plan:  38w6d     Diagnoses and all orders for this visit:    1. Supervision of other normal pregnancy, antepartum (Primary)  Overview:  AZEB finalized: 24 by 8-week US, changed from LMP    Declined NIPS, AFP and CF/SMA    COVID: Recommended, declines   Flu: Vaccinated  Tdap:  Vaccinated    ? Desires Sterilization: Declined    Anatomy US: 23 consistent w dates,  posterior placenta previa, breech, wnl completed, male  FU US: 3/15/24 [facility] 5#9oz 91%, AC >99%, RADHA 17, VTX, low lying placenta 1.8  FU US: 24 [facility] 7#14oz, 95%, AC >99%- nl glucola, RADHA 16.5, VTX, post fundal placenta    PROBLEM LIST/PLAN:   CHTN- No meds, see separate  AMA - 37yo  Third trimester ultrasound for growth - done  NSTs q week Fri  Declined MFM for anatomy  Previous  x2 -  Plan repeat   30April 39+3 RCS AP kangaroo in [place]    Low Lying placenta, posterior- resolved   Unsatisfactory pap- HPV was neg, consider repeat pap PP    Orders:  -     POC Urinalysis Dipstick    2. Chronic hypertension  Overview:  No meds  Baseline labs wnl, UPC 0.17  Baby ASA- continue- plan to stop 23April  BP cuff, check BP at home, goal 130/80. Check daily  Weekly NSTs at 32 weeks- qFri  Growth US -done    History:  2023 Rx Labetalol 100mg BID.  Bps at home wnl, had not started labetalol        Counseling:    OB precautions, leaking, VB, dianne dickey vs PTL/Labor  FKC  CS reviewed in detail.  All history  reviewed and updated.  Preop exam performed.  See separate scanned in counseling note.  All questions answered.  She desires to proceed as planned.     Reassuring pregnancy progress. Questions answered.  Continue PNV.  Importance of healthy eating, obtaining sufficient sleep, and staying active unless hypertensive- activity modified as directed.    Return in about 1 week (around 5/3/2024) for Postpartum FU w BP check.            Whitney Santamaria, DO  04/26/2024    [facility] [place] [facility]  [address] [place] [address]  Dept: [phone]  Dept Fax: [phone]  Loc: [phone]  Loc Fax: [phone]

## 2024-04-26 ENCOUNTER — HOSPITAL ENCOUNTER (OUTPATIENT)
Dept: LABOR AND DELIVERY | Facility: HOSPITAL | Age: 39
Discharge: HOME | End: 2024-04-26
Payer: COMMERCIAL

## 2024-04-26 ENCOUNTER — HOSPITAL ENCOUNTER (OUTPATIENT)
Facility: HOSPITAL | Age: 39
Discharge: HOME | End: 2024-04-26
Attending: STUDENT IN AN ORGANIZED HEALTH CARE EDUCATION/TRAINING PROGRAM | Admitting: STUDENT IN AN ORGANIZED HEALTH CARE EDUCATION/TRAINING PROGRAM
Payer: COMMERCIAL

## 2024-04-26 ENCOUNTER — ROUTINE PRENATAL (OUTPATIENT)
Dept: OBSTETRICS AND GYNECOLOGY | Facility: CLINIC | Age: 39
End: 2024-04-26
Payer: COMMERCIAL

## 2024-04-26 VITALS — BODY MASS INDEX: 36.61 KG/M2 | DIASTOLIC BLOOD PRESSURE: 85 MMHG | WEIGHT: 206.6 LBS | SYSTOLIC BLOOD PRESSURE: 118 MMHG

## 2024-04-26 VITALS
DIASTOLIC BLOOD PRESSURE: 75 MMHG | OXYGEN SATURATION: 98 % | SYSTOLIC BLOOD PRESSURE: 111 MMHG | RESPIRATION RATE: 18 BRPM | HEART RATE: 83 BPM

## 2024-04-26 DIAGNOSIS — I10 CHRONIC HYPERTENSION: ICD-10-CM

## 2024-04-26 DIAGNOSIS — Z34.80 SUPERVISION OF OTHER NORMAL PREGNANCY, ANTEPARTUM: Primary | ICD-10-CM

## 2024-04-26 LAB
GLUCOSE UR STRIP-MCNC: NEGATIVE MG/DL
PROT UR STRIP-MCNC: NEGATIVE MG/DL

## 2024-04-26 PROCEDURE — 59025 FETAL NON-STRESS TEST: CPT

## 2024-04-26 PROCEDURE — 59025 FETAL NON-STRESS TEST: CPT | Performed by: STUDENT IN AN ORGANIZED HEALTH CARE EDUCATION/TRAINING PROGRAM

## 2024-04-26 PROCEDURE — 0502F SUBSEQUENT PRENATAL CARE: CPT | Performed by: OBSTETRICS & GYNECOLOGY

## 2024-04-26 NOTE — NON STRESS TEST
Obstetrical Non-stress Test Interpretation     Name:  Shelby Jon  MRN: [id number]    38 y.o. female  at 38w6d    Indication: chronic hypertension       Fetal Assessment  Fetal Movement: active  Fetal HR Assessment Method: external  Fetal HR (beats/min): 135  Fetal HR Baseline: normal range  Fetal HR Variability: moderate (amplitude range 6 to 25 bpm)  Fetal HR Accelerations: greater than/equal to 15 bpm, lasting at least 15 seconds  Fetal HR Decelerations: absent  Sinusoidal Pattern Present: absent    /75 (BP Location: Right arm)   Pulse 83   Resp 18   LMP 2023   SpO2 98%     Reason for test: Hypertension  Date of Test: 2024  Time frame of test: 6727-3439  RN NST Interpretation: Reactive      Latia Sutton RN  2024  11:08 EDT

## 2024-04-29 ENCOUNTER — PREP FOR SURGERY (OUTPATIENT)
Dept: OTHER | Facility: HOSPITAL | Age: 39
End: 2024-04-29
Payer: COMMERCIAL

## 2024-04-29 DIAGNOSIS — Z98.891 PREVIOUS CESAREAN SECTION: Primary | ICD-10-CM

## 2024-04-29 RX ORDER — KETOROLAC TROMETHAMINE 30 MG/ML
30 INJECTION, SOLUTION INTRAMUSCULAR; INTRAVENOUS ONCE
Status: CANCELLED | OUTPATIENT
Start: 2024-04-29 | End: 2024-04-29

## 2024-04-29 RX ORDER — CITRIC ACID/SODIUM CITRATE 334-500MG
30 SOLUTION, ORAL ORAL ONCE
Status: CANCELLED | OUTPATIENT
Start: 2024-04-29 | End: 2024-04-29

## 2024-04-29 RX ORDER — LIDOCAINE HYDROCHLORIDE 10 MG/ML
0.5 INJECTION, SOLUTION INFILTRATION; PERINEURAL ONCE AS NEEDED
Status: CANCELLED | OUTPATIENT
Start: 2024-04-29

## 2024-04-29 RX ORDER — CEFAZOLIN SODIUM 2 G/100ML
2 INJECTION, SOLUTION INTRAVENOUS ONCE
Status: CANCELLED | OUTPATIENT
Start: 2024-04-29 | End: 2024-04-29

## 2024-04-29 RX ORDER — ACETAMINOPHEN 500 MG
1000 TABLET ORAL ONCE
Status: CANCELLED | OUTPATIENT
Start: 2024-04-29 | End: 2024-04-29

## 2024-04-29 RX ORDER — OXYTOCIN/0.9 % SODIUM CHLORIDE 30/500 ML
250 PLASTIC BAG, INJECTION (ML) INTRAVENOUS CONTINUOUS
Status: CANCELLED | OUTPATIENT
Start: 2024-04-29 | End: 2024-04-29

## 2024-04-29 RX ORDER — SODIUM CHLORIDE 9 MG/ML
40 INJECTION, SOLUTION INTRAVENOUS AS NEEDED
Status: CANCELLED | OUTPATIENT
Start: 2024-04-29

## 2024-04-29 RX ORDER — ONDANSETRON 4 MG/1
4 TABLET, ORALLY DISINTEGRATING ORAL EVERY 6 HOURS PRN
Status: CANCELLED | OUTPATIENT
Start: 2024-04-29

## 2024-04-29 RX ORDER — SODIUM CHLORIDE, SODIUM LACTATE, POTASSIUM CHLORIDE, CALCIUM CHLORIDE 600; 310; 30; 20 MG/100ML; MG/100ML; MG/100ML; MG/100ML
150 INJECTION, SOLUTION INTRAVENOUS CONTINUOUS
Status: CANCELLED | OUTPATIENT
Start: 2024-04-29

## 2024-04-29 RX ORDER — FAMOTIDINE 10 MG/ML
20 INJECTION, SOLUTION INTRAVENOUS ONCE AS NEEDED
Status: CANCELLED | OUTPATIENT
Start: 2024-04-29

## 2024-04-29 RX ORDER — SODIUM CHLORIDE 0.9 % (FLUSH) 0.9 %
10 SYRINGE (ML) INJECTION EVERY 12 HOURS SCHEDULED
Status: CANCELLED | OUTPATIENT
Start: 2024-04-29

## 2024-04-29 RX ORDER — ONDANSETRON 2 MG/ML
4 INJECTION INTRAMUSCULAR; INTRAVENOUS EVERY 6 HOURS PRN
Status: CANCELLED | OUTPATIENT
Start: 2024-04-29

## 2024-04-29 RX ORDER — SODIUM CHLORIDE 0.9 % (FLUSH) 0.9 %
10 SYRINGE (ML) INJECTION AS NEEDED
Status: CANCELLED | OUTPATIENT
Start: 2024-04-29

## 2024-04-29 RX ORDER — TRANEXAMIC ACID 10 MG/ML
1000 INJECTION, SOLUTION INTRAVENOUS ONCE AS NEEDED
Status: CANCELLED | OUTPATIENT
Start: 2024-04-29

## 2024-04-29 RX ORDER — OXYTOCIN/0.9 % SODIUM CHLORIDE 30/500 ML
999 PLASTIC BAG, INJECTION (ML) INTRAVENOUS ONCE
Status: CANCELLED | OUTPATIENT
Start: 2024-04-29 | End: 2024-04-29

## 2024-04-29 RX ORDER — FAMOTIDINE 20 MG/1
20 TABLET, FILM COATED ORAL ONCE AS NEEDED
Status: CANCELLED | OUTPATIENT
Start: 2024-04-29

## 2024-04-29 RX ORDER — MISOPROSTOL 200 UG/1
800 TABLET ORAL AS NEEDED
Status: CANCELLED | OUTPATIENT
Start: 2024-04-29

## 2024-04-30 ENCOUNTER — ANESTHESIA (OUTPATIENT)
Dept: LABOR AND DELIVERY | Facility: HOSPITAL | Age: 39
End: 2024-04-30
Payer: COMMERCIAL

## 2024-04-30 ENCOUNTER — HOSPITAL ENCOUNTER (INPATIENT)
Facility: HOSPITAL | Age: 39
LOS: 2 days | Discharge: HOME | End: 2024-05-02
Attending: OBSTETRICS & GYNECOLOGY | Admitting: OBSTETRICS & GYNECOLOGY
Payer: COMMERCIAL

## 2024-04-30 ENCOUNTER — ANESTHESIA EVENT (OUTPATIENT)
Dept: LABOR AND DELIVERY | Facility: HOSPITAL | Age: 39
End: 2024-04-30
Payer: COMMERCIAL

## 2024-04-30 DIAGNOSIS — Z98.891 PREVIOUS CESAREAN SECTION: ICD-10-CM

## 2024-04-30 PROBLEM — R87.610 ASCUS OF CERVIX WITH NEGATIVE HIGH RISK HPV: Status: ACTIVE | Noted: 2024-04-30

## 2024-04-30 LAB
ABO GROUP BLD: NORMAL
ALBUMIN SERPL-MCNC: 3.5 G/DL (ref 3.5–5.2)
ALBUMIN/GLOB SERPL: 1.2 G/DL
ALP SERPL-CCNC: 158 U/L (ref 39–117)
ALT SERPL W P-5'-P-CCNC: 14 U/L (ref 1–33)
AMPHET+METHAMPHET UR QL: NEGATIVE
ANION GAP SERPL CALCULATED.3IONS-SCNC: 11.6 MMOL/L (ref 5–15)
AST SERPL-CCNC: 18 U/L (ref 1–32)
BARBITURATES UR QL SCN: NEGATIVE
BASE EXCESS BLDCOA CALC-SCNC: -3.8 MMOL/L (ref -2–2)
BASE EXCESS BLDCOV CALC-SCNC: -1.6 MMOL/L (ref -2–2)
BENZODIAZ UR QL SCN: NEGATIVE
BILIRUB SERPL-MCNC: 0.2 MG/DL (ref 0–1.2)
BLD GP AB SCN SERPL QL: NEGATIVE
BUN SERPL-MCNC: 8 MG/DL (ref 6–20)
BUN/CREAT SERPL: 19 (ref 7–25)
CALCIUM SPEC-SCNC: 8.8 MG/DL (ref 8.6–10.5)
CANNABINOIDS SERPL QL: NEGATIVE
CHLORIDE SERPL-SCNC: 103 MMOL/L (ref 98–107)
CO2 SERPL-SCNC: 21.4 MMOL/L (ref 22–29)
COCAINE UR QL: NEGATIVE
CREAT SERPL-MCNC: 0.42 MG/DL (ref 0.57–1)
DEPRECATED RDW RBC AUTO: 42.4 FL (ref 37–54)
EGFRCR SERPLBLD CKD-EPI 2021: 129.4 ML/MIN/1.73
ERYTHROCYTE [DISTWIDTH] IN BLOOD BY AUTOMATED COUNT: 13.4 % (ref 12.3–15.4)
FENTANYL UR-MCNC: NEGATIVE NG/ML
GLOBULIN UR ELPH-MCNC: 3 GM/DL
GLUCOSE SERPL-MCNC: 85 MG/DL (ref 65–99)
HCO3 BLDCOA-SCNC: 23.8 MMOL/L
HCO3 BLDCOV-SCNC: 24.3 MMOL/L
HCT VFR BLD AUTO: 35.2 % (ref 34–46.6)
HGB BLD-MCNC: 11.4 G/DL (ref 12–15.9)
MCH RBC QN AUTO: 28.4 PG (ref 26.6–33)
MCHC RBC AUTO-ENTMCNC: 32.4 G/DL (ref 31.5–35.7)
MCV RBC AUTO: 87.6 FL (ref 79–97)
METHADONE UR QL SCN: NEGATIVE
OPIATES UR QL: NEGATIVE
OXYCODONE UR QL SCN: NEGATIVE
PCO2 BLDCOA: 53 MMHG (ref 33–49)
PCO2 BLDCOV: 45.1 MM HG (ref 28–40)
PH BLDCOA: 7.27 PH UNITS (ref 7.21–7.31)
PH BLDCOV: 7.35 PH UNITS (ref 7.31–7.37)
PLATELET # BLD AUTO: 277 10*3/MM3 (ref 140–450)
PMV BLD AUTO: 11.5 FL (ref 6–12)
PO2 BLDCOA: <40.5 MMHG
PO2 BLDCOV: <40.5 MM HG (ref 21–31)
POTASSIUM SERPL-SCNC: 3.9 MMOL/L (ref 3.5–5.2)
PROT SERPL-MCNC: 6.5 G/DL (ref 6–8.5)
RBC # BLD AUTO: 4.02 10*6/MM3 (ref 3.77–5.28)
RH BLD: POSITIVE
SODIUM SERPL-SCNC: 136 MMOL/L (ref 136–145)
T PALLIDUM IGG SER QL: NORMAL
T&S EXPIRATION DATE: NORMAL
WBC NRBC COR # BLD AUTO: 9.46 10*3/MM3 (ref 3.4–10.8)

## 2024-04-30 PROCEDURE — 85027 COMPLETE CBC AUTOMATED: CPT | Performed by: OBSTETRICS & GYNECOLOGY

## 2024-04-30 PROCEDURE — 82803 BLOOD GASES ANY COMBINATION: CPT | Performed by: OBSTETRICS & GYNECOLOGY

## 2024-04-30 PROCEDURE — 25010000002 MORPHINE PER 10 MG: Performed by: NURSE ANESTHETIST, CERTIFIED REGISTERED

## 2024-04-30 PROCEDURE — 3E0P05Z INTRODUCTION OF ADHESION BARRIER INTO FEMALE REPRODUCTIVE, OPEN APPROACH: ICD-10-PCS | Performed by: OBSTETRICS & GYNECOLOGY

## 2024-04-30 PROCEDURE — 25810000003 LACTATED RINGERS PER 1000 ML: Performed by: OBSTETRICS & GYNECOLOGY

## 2024-04-30 PROCEDURE — 25010000002 KETOROLAC TROMETHAMINE PER 15 MG: Performed by: OBSTETRICS & GYNECOLOGY

## 2024-04-30 PROCEDURE — 86901 BLOOD TYPING SEROLOGIC RH(D): CPT | Performed by: OBSTETRICS & GYNECOLOGY

## 2024-04-30 PROCEDURE — 80307 DRUG TEST PRSMV CHEM ANLYZR: CPT | Performed by: OBSTETRICS & GYNECOLOGY

## 2024-04-30 PROCEDURE — 25010000002 BUPIVACAINE PF 0.75 % SOLUTION: Performed by: NURSE ANESTHETIST, CERTIFIED REGISTERED

## 2024-04-30 PROCEDURE — 25010000002 CEFAZOLIN SODIUM 2 G RECONSTITUTED SOLUTION: Performed by: OBSTETRICS & GYNECOLOGY

## 2024-04-30 PROCEDURE — 80053 COMPREHEN METABOLIC PANEL: CPT | Performed by: OBSTETRICS & GYNECOLOGY

## 2024-04-30 PROCEDURE — 51702 INSERT TEMP BLADDER CATH: CPT

## 2024-04-30 PROCEDURE — 25010000002 OXYTOCIN PER 10 UNITS: Performed by: NURSE ANESTHETIST, CERTIFIED REGISTERED

## 2024-04-30 PROCEDURE — C1765 ADHESION BARRIER: HCPCS | Performed by: OBSTETRICS & GYNECOLOGY

## 2024-04-30 PROCEDURE — 86780 TREPONEMA PALLIDUM: CPT | Performed by: OBSTETRICS & GYNECOLOGY

## 2024-04-30 PROCEDURE — 25010000002 ONDANSETRON PER 1 MG: Performed by: OBSTETRICS & GYNECOLOGY

## 2024-04-30 PROCEDURE — 86900 BLOOD TYPING SEROLOGIC ABO: CPT | Performed by: OBSTETRICS & GYNECOLOGY

## 2024-04-30 PROCEDURE — 86850 RBC ANTIBODY SCREEN: CPT | Performed by: OBSTETRICS & GYNECOLOGY

## 2024-04-30 PROCEDURE — 59025 FETAL NON-STRESS TEST: CPT

## 2024-04-30 PROCEDURE — 59510 CESAREAN DELIVERY: CPT | Performed by: OBSTETRICS & GYNECOLOGY

## 2024-04-30 PROCEDURE — 25810000003 LACTATED RINGERS SOLUTION: Performed by: OBSTETRICS & GYNECOLOGY

## 2024-04-30 DEVICE — ABSORBABLE ADHESION BARRIER
Type: IMPLANTABLE DEVICE | Site: UTERUS | Status: FUNCTIONAL
Brand: GYNECARE INTERCEED

## 2024-04-30 RX ORDER — MORPHINE SULFATE 0.5 MG/ML
INJECTION, SOLUTION EPIDURAL; INTRATHECAL; INTRAVENOUS AS NEEDED
Status: DISCONTINUED | OUTPATIENT
Start: 2024-04-30 | End: 2024-04-30 | Stop reason: SURG

## 2024-04-30 RX ORDER — KETOROLAC TROMETHAMINE 15 MG/ML
15 INJECTION, SOLUTION INTRAMUSCULAR; INTRAVENOUS EVERY 6 HOURS
Status: COMPLETED | OUTPATIENT
Start: 2024-04-30 | End: 2024-05-01

## 2024-04-30 RX ORDER — LIDOCAINE HYDROCHLORIDE 10 MG/ML
0.5 INJECTION, SOLUTION INFILTRATION; PERINEURAL ONCE AS NEEDED
Status: DISCONTINUED | OUTPATIENT
Start: 2024-04-30 | End: 2024-04-30 | Stop reason: HOSPADM

## 2024-04-30 RX ORDER — SIMETHICONE 80 MG
80 TABLET,CHEWABLE ORAL 4 TIMES DAILY PRN
Status: DISCONTINUED | OUTPATIENT
Start: 2024-04-30 | End: 2024-05-02 | Stop reason: HOSPADM

## 2024-04-30 RX ORDER — ENOXAPARIN SODIUM 100 MG/ML
40 INJECTION SUBCUTANEOUS NIGHTLY
Status: DISCONTINUED | OUTPATIENT
Start: 2024-05-01 | End: 2024-05-02 | Stop reason: HOSPADM

## 2024-04-30 RX ORDER — ACETAMINOPHEN 325 MG/1
650 TABLET ORAL EVERY 6 HOURS
Status: DISCONTINUED | OUTPATIENT
Start: 2024-05-01 | End: 2024-05-02 | Stop reason: HOSPADM

## 2024-04-30 RX ORDER — SODIUM CHLORIDE 9 MG/ML
40 INJECTION, SOLUTION INTRAVENOUS AS NEEDED
Status: DISCONTINUED | OUTPATIENT
Start: 2024-04-30 | End: 2024-04-30 | Stop reason: HOSPADM

## 2024-04-30 RX ORDER — SODIUM CHLORIDE 0.9 % (FLUSH) 0.9 %
10 SYRINGE (ML) INJECTION AS NEEDED
Status: DISCONTINUED | OUTPATIENT
Start: 2024-04-30 | End: 2024-05-02 | Stop reason: HOSPADM

## 2024-04-30 RX ORDER — BUPIVACAINE HYDROCHLORIDE 7.5 MG/ML
INJECTION, SOLUTION EPIDURAL; RETROBULBAR
Status: COMPLETED | OUTPATIENT
Start: 2024-04-30 | End: 2024-04-30

## 2024-04-30 RX ORDER — FAMOTIDINE 10 MG/ML
20 INJECTION, SOLUTION INTRAVENOUS ONCE AS NEEDED
Status: DISCONTINUED | OUTPATIENT
Start: 2024-04-30 | End: 2024-04-30 | Stop reason: HOSPADM

## 2024-04-30 RX ORDER — FAMOTIDINE 10 MG/ML
20 INJECTION, SOLUTION INTRAVENOUS ONCE AS NEEDED
Status: COMPLETED | OUTPATIENT
Start: 2024-04-30 | End: 2024-04-30

## 2024-04-30 RX ORDER — OXYCODONE HYDROCHLORIDE 5 MG/1
5 TABLET ORAL EVERY 6 HOURS PRN
Status: DISCONTINUED | OUTPATIENT
Start: 2024-04-30 | End: 2024-05-02 | Stop reason: HOSPADM

## 2024-04-30 RX ORDER — MISOPROSTOL 200 UG/1
TABLET ORAL
Status: DISPENSED
Start: 2024-04-30 | End: 2024-04-30

## 2024-04-30 RX ORDER — SODIUM CHLORIDE 9 MG/ML
40 INJECTION, SOLUTION INTRAVENOUS AS NEEDED
Status: DISCONTINUED | OUTPATIENT
Start: 2024-04-30 | End: 2024-05-02 | Stop reason: HOSPADM

## 2024-04-30 RX ORDER — SODIUM CHLORIDE 0.9 % (FLUSH) 0.9 %
10 SYRINGE (ML) INJECTION AS NEEDED
Status: DISCONTINUED | OUTPATIENT
Start: 2024-04-30 | End: 2024-04-30 | Stop reason: HOSPADM

## 2024-04-30 RX ORDER — CITRIC ACID/SODIUM CITRATE 334-500MG
30 SOLUTION, ORAL ORAL ONCE
Status: DISCONTINUED | OUTPATIENT
Start: 2024-04-30 | End: 2024-04-30 | Stop reason: HOSPADM

## 2024-04-30 RX ORDER — ONDANSETRON 2 MG/ML
4 INJECTION INTRAMUSCULAR; INTRAVENOUS EVERY 6 HOURS PRN
Status: DISCONTINUED | OUTPATIENT
Start: 2024-04-30 | End: 2024-04-30 | Stop reason: HOSPADM

## 2024-04-30 RX ORDER — SODIUM CHLORIDE 0.9 % (FLUSH) 0.9 %
10 SYRINGE (ML) INJECTION EVERY 12 HOURS SCHEDULED
Status: DISCONTINUED | OUTPATIENT
Start: 2024-04-30 | End: 2024-04-30 | Stop reason: HOSPADM

## 2024-04-30 RX ORDER — OXYCODONE HYDROCHLORIDE 5 MG/1
10 TABLET ORAL EVERY 6 HOURS PRN
Status: DISCONTINUED | OUTPATIENT
Start: 2024-04-30 | End: 2024-05-02 | Stop reason: HOSPADM

## 2024-04-30 RX ORDER — SODIUM CHLORIDE, SODIUM LACTATE, POTASSIUM CHLORIDE, CALCIUM CHLORIDE 600; 310; 30; 20 MG/100ML; MG/100ML; MG/100ML; MG/100ML
150 INJECTION, SOLUTION INTRAVENOUS CONTINUOUS
Status: DISCONTINUED | OUTPATIENT
Start: 2024-04-30 | End: 2024-04-30

## 2024-04-30 RX ORDER — MEPERIDINE HYDROCHLORIDE 25 MG/ML
12.5 INJECTION INTRAMUSCULAR; INTRAVENOUS; SUBCUTANEOUS
Status: DISCONTINUED | OUTPATIENT
Start: 2024-04-30 | End: 2024-04-30 | Stop reason: HOSPADM

## 2024-04-30 RX ORDER — FAMOTIDINE 20 MG/1
20 TABLET, FILM COATED ORAL ONCE AS NEEDED
Status: DISCONTINUED | OUTPATIENT
Start: 2024-04-30 | End: 2024-04-30 | Stop reason: HOSPADM

## 2024-04-30 RX ORDER — ALUMINA, MAGNESIA, AND SIMETHICONE 2400; 2400; 240 MG/30ML; MG/30ML; MG/30ML
15 SUSPENSION ORAL EVERY 4 HOURS PRN
Status: DISCONTINUED | OUTPATIENT
Start: 2024-04-30 | End: 2024-05-02 | Stop reason: HOSPADM

## 2024-04-30 RX ORDER — HYDROXYZINE HYDROCHLORIDE 25 MG/1
50 TABLET, FILM COATED ORAL EVERY 6 HOURS PRN
Status: DISCONTINUED | OUTPATIENT
Start: 2024-04-30 | End: 2024-05-02 | Stop reason: HOSPADM

## 2024-04-30 RX ORDER — EPHEDRINE SULFATE 50 MG/ML
INJECTION INTRAVENOUS AS NEEDED
Status: DISCONTINUED | OUTPATIENT
Start: 2024-04-30 | End: 2024-04-30 | Stop reason: SURG

## 2024-04-30 RX ORDER — DOCUSATE SODIUM 100 MG/1
100 CAPSULE, LIQUID FILLED ORAL DAILY
Status: DISCONTINUED | OUTPATIENT
Start: 2024-04-30 | End: 2024-05-02 | Stop reason: HOSPADM

## 2024-04-30 RX ORDER — FLUTICASONE PROPIONATE 50 MCG
2 SPRAY, SUSPENSION (ML) NASAL DAILY
Status: DISCONTINUED | OUTPATIENT
Start: 2024-04-30 | End: 2024-05-02 | Stop reason: HOSPADM

## 2024-04-30 RX ORDER — BUPIVACAINE HCL/0.9 % NACL/PF 0.1 %
2 PLASTIC BAG, INJECTION (ML) EPIDURAL ONCE
Status: COMPLETED | OUTPATIENT
Start: 2024-04-30 | End: 2024-04-30

## 2024-04-30 RX ORDER — ONDANSETRON 4 MG/1
4 TABLET, ORALLY DISINTEGRATING ORAL EVERY 6 HOURS PRN
Status: DISCONTINUED | OUTPATIENT
Start: 2024-04-30 | End: 2024-04-30 | Stop reason: HOSPADM

## 2024-04-30 RX ORDER — OXYTOCIN/0.9 % SODIUM CHLORIDE 30/500 ML
250 PLASTIC BAG, INJECTION (ML) INTRAVENOUS CONTINUOUS
Status: ACTIVE | OUTPATIENT
Start: 2024-04-30 | End: 2024-04-30

## 2024-04-30 RX ORDER — SODIUM CHLORIDE, SODIUM LACTATE, POTASSIUM CHLORIDE, CALCIUM CHLORIDE 600; 310; 30; 20 MG/100ML; MG/100ML; MG/100ML; MG/100ML
125 INJECTION, SOLUTION INTRAVENOUS CONTINUOUS
Status: DISCONTINUED | OUTPATIENT
Start: 2024-04-30 | End: 2024-05-02 | Stop reason: HOSPADM

## 2024-04-30 RX ORDER — MISOPROSTOL 200 UG/1
800 TABLET ORAL AS NEEDED
Status: DISCONTINUED | OUTPATIENT
Start: 2024-04-30 | End: 2024-04-30 | Stop reason: HOSPADM

## 2024-04-30 RX ORDER — IBUPROFEN 600 MG/1
600 TABLET ORAL EVERY 6 HOURS
Status: DISCONTINUED | OUTPATIENT
Start: 2024-05-01 | End: 2024-05-02 | Stop reason: HOSPADM

## 2024-04-30 RX ORDER — DIPHENHYDRAMINE HCL 25 MG
25 CAPSULE ORAL EVERY 6 HOURS PRN
Status: ACTIVE | OUTPATIENT
Start: 2024-04-30 | End: 2024-05-01

## 2024-04-30 RX ORDER — NALOXONE HCL 0.4 MG/ML
0.4 VIAL (ML) INJECTION ONCE AS NEEDED
Status: ACTIVE | OUTPATIENT
Start: 2024-04-30 | End: 2024-05-01

## 2024-04-30 RX ORDER — MORPHINE SULFATE 0.5 MG/ML
INJECTION, SOLUTION EPIDURAL; INTRATHECAL; INTRAVENOUS
Status: COMPLETED | OUTPATIENT
Start: 2024-04-30 | End: 2024-04-30

## 2024-04-30 RX ORDER — HYDROMORPHONE HYDROCHLORIDE 2 MG/ML
0.25 INJECTION, SOLUTION INTRAMUSCULAR; INTRAVENOUS; SUBCUTANEOUS
Status: DISCONTINUED | OUTPATIENT
Start: 2024-04-30 | End: 2024-04-30 | Stop reason: HOSPADM

## 2024-04-30 RX ORDER — PROMETHAZINE HYDROCHLORIDE 25 MG/1
12.5 TABLET ORAL EVERY 4 HOURS PRN
Status: DISCONTINUED | OUTPATIENT
Start: 2024-04-30 | End: 2024-05-02 | Stop reason: HOSPADM

## 2024-04-30 RX ORDER — MISOPROSTOL 100 UG/1
TABLET ORAL AS NEEDED
Status: DISCONTINUED | OUTPATIENT
Start: 2024-04-30 | End: 2024-05-02 | Stop reason: HOSPADM

## 2024-04-30 RX ORDER — AMOXICILLIN 250 MG
1 CAPSULE ORAL 2 TIMES DAILY PRN
Status: DISCONTINUED | OUTPATIENT
Start: 2024-04-30 | End: 2024-05-02 | Stop reason: HOSPADM

## 2024-04-30 RX ORDER — HYDROCORTISONE 25 MG/G
CREAM TOPICAL 3 TIMES DAILY PRN
Status: DISCONTINUED | OUTPATIENT
Start: 2024-04-30 | End: 2024-05-02 | Stop reason: HOSPADM

## 2024-04-30 RX ORDER — CALCIUM CARBONATE 500 MG/1
1 TABLET, CHEWABLE ORAL EVERY 4 HOURS PRN
Status: DISCONTINUED | OUTPATIENT
Start: 2024-04-30 | End: 2024-05-02 | Stop reason: HOSPADM

## 2024-04-30 RX ORDER — OXYTOCIN/0.9 % SODIUM CHLORIDE 30/500 ML
999 PLASTIC BAG, INJECTION (ML) INTRAVENOUS ONCE
Status: DISCONTINUED | OUTPATIENT
Start: 2024-04-30 | End: 2024-04-30 | Stop reason: HOSPADM

## 2024-04-30 RX ORDER — DIPHENHYDRAMINE HYDROCHLORIDE 50 MG/ML
12.5 INJECTION INTRAMUSCULAR; INTRAVENOUS EVERY 6 HOURS PRN
Status: ACTIVE | OUTPATIENT
Start: 2024-04-30 | End: 2024-05-01

## 2024-04-30 RX ORDER — HYDROMORPHONE HYDROCHLORIDE 2 MG/ML
0.5 INJECTION, SOLUTION INTRAMUSCULAR; INTRAVENOUS; SUBCUTANEOUS
Status: DISCONTINUED | OUTPATIENT
Start: 2024-04-30 | End: 2024-04-30 | Stop reason: HOSPADM

## 2024-04-30 RX ORDER — TRANEXAMIC ACID 10 MG/ML
1000 INJECTION, SOLUTION INTRAVENOUS ONCE AS NEEDED
Status: DISCONTINUED | OUTPATIENT
Start: 2024-04-30 | End: 2024-04-30 | Stop reason: HOSPADM

## 2024-04-30 RX ORDER — ONDANSETRON 4 MG/1
4 TABLET, ORALLY DISINTEGRATING ORAL EVERY 6 HOURS PRN
Status: DISCONTINUED | OUTPATIENT
Start: 2024-04-30 | End: 2024-05-02 | Stop reason: HOSPADM

## 2024-04-30 RX ORDER — NALOXONE HCL 0.4 MG/ML
0.4 VIAL (ML) INJECTION
Status: DISCONTINUED | OUTPATIENT
Start: 2024-04-30 | End: 2024-05-02 | Stop reason: HOSPADM

## 2024-04-30 RX ORDER — SODIUM CHLORIDE 0.9 % (FLUSH) 0.9 %
10 SYRINGE (ML) INJECTION EVERY 12 HOURS SCHEDULED
Status: DISCONTINUED | OUTPATIENT
Start: 2024-04-30 | End: 2024-05-02 | Stop reason: HOSPADM

## 2024-04-30 RX ORDER — OXYTOCIN 10 [USP'U]/ML
INJECTION, SOLUTION INTRAMUSCULAR; INTRAVENOUS AS NEEDED
Status: DISCONTINUED | OUTPATIENT
Start: 2024-04-30 | End: 2024-04-30 | Stop reason: SURG

## 2024-04-30 RX ORDER — ACETAMINOPHEN 500 MG
1000 TABLET ORAL ONCE
Status: COMPLETED | OUTPATIENT
Start: 2024-04-30 | End: 2024-04-30

## 2024-04-30 RX ORDER — PHENYLEPHRINE HCL IN 0.9% NACL 1 MG/10 ML
SYRINGE (ML) INTRAVENOUS AS NEEDED
Status: DISCONTINUED | OUTPATIENT
Start: 2024-04-30 | End: 2024-04-30 | Stop reason: SURG

## 2024-04-30 RX ORDER — ONDANSETRON 2 MG/ML
4 INJECTION INTRAMUSCULAR; INTRAVENOUS EVERY 6 HOURS PRN
Status: DISCONTINUED | OUTPATIENT
Start: 2024-04-30 | End: 2024-05-02 | Stop reason: HOSPADM

## 2024-04-30 RX ORDER — ACETAMINOPHEN 500 MG
1000 TABLET ORAL EVERY 6 HOURS
Status: COMPLETED | OUTPATIENT
Start: 2024-04-30 | End: 2024-05-01

## 2024-04-30 RX ORDER — KETOROLAC TROMETHAMINE 30 MG/ML
30 INJECTION, SOLUTION INTRAMUSCULAR; INTRAVENOUS ONCE
Status: COMPLETED | OUTPATIENT
Start: 2024-04-30 | End: 2024-04-30

## 2024-04-30 RX ADMIN — MORPHINE SULFATE 4.85 MG: 0.5 INJECTION, SOLUTION EPIDURAL; INTRATHECAL; INTRAVENOUS at 10:23

## 2024-04-30 RX ADMIN — ONDANSETRON 4 MG: 2 INJECTION INTRAMUSCULAR; INTRAVENOUS at 13:58

## 2024-04-30 RX ADMIN — ACETAMINOPHEN 1000 MG: 500 TABLET ORAL at 21:43

## 2024-04-30 RX ADMIN — ACETAMINOPHEN 1000 MG: 500 TABLET ORAL at 09:03

## 2024-04-30 RX ADMIN — SODIUM CHLORIDE, POTASSIUM CHLORIDE, SODIUM LACTATE AND CALCIUM CHLORIDE 1000 ML: 600; 310; 30; 20 INJECTION, SOLUTION INTRAVENOUS at 09:05

## 2024-04-30 RX ADMIN — SODIUM CHLORIDE, POTASSIUM CHLORIDE, SODIUM LACTATE AND CALCIUM CHLORIDE: 600; 310; 30; 20 INJECTION, SOLUTION INTRAVENOUS at 10:05

## 2024-04-30 RX ADMIN — BUPIVACAINE HYDROCHLORIDE 1.4 ML: 7.5 INJECTION, SOLUTION EPIDURAL; RETROBULBAR at 09:39

## 2024-04-30 RX ADMIN — KETOROLAC TROMETHAMINE 15 MG: 15 INJECTION, SOLUTION INTRAMUSCULAR; INTRAVENOUS at 16:44

## 2024-04-30 RX ADMIN — Medication 200 MCG: at 09:44

## 2024-04-30 RX ADMIN — KETOROLAC TROMETHAMINE 30 MG: 30 INJECTION, SOLUTION INTRAMUSCULAR; INTRAVENOUS at 11:16

## 2024-04-30 RX ADMIN — Medication 200 MCG: at 10:00

## 2024-04-30 RX ADMIN — EPHEDRINE SULFATE 15 MG: 50 INJECTION INTRAVENOUS at 09:49

## 2024-04-30 RX ADMIN — SODIUM CHLORIDE, POTASSIUM CHLORIDE, SODIUM LACTATE AND CALCIUM CHLORIDE 125 ML/HR: 600; 310; 30; 20 INJECTION, SOLUTION INTRAVENOUS at 15:00

## 2024-04-30 RX ADMIN — KETOROLAC TROMETHAMINE 15 MG: 15 INJECTION, SOLUTION INTRAMUSCULAR; INTRAVENOUS at 22:31

## 2024-04-30 RX ADMIN — OXYCODONE 5 MG: 5 TABLET ORAL at 13:57

## 2024-04-30 RX ADMIN — MORPHINE SULFATE 0.15 MG: 0.5 INJECTION, SOLUTION EPIDURAL; INTRATHECAL; INTRAVENOUS at 09:39

## 2024-04-30 RX ADMIN — ACETAMINOPHEN 1000 MG: 500 TABLET ORAL at 14:58

## 2024-04-30 RX ADMIN — CEFAZOLIN 2 G: 2 INJECTION, POWDER, FOR SOLUTION INTRAVENOUS at 09:05

## 2024-04-30 RX ADMIN — SODIUM CHLORIDE, POTASSIUM CHLORIDE, SODIUM LACTATE AND CALCIUM CHLORIDE 150 ML/HR: 600; 310; 30; 20 INJECTION, SOLUTION INTRAVENOUS at 09:04

## 2024-04-30 RX ADMIN — OXYTOCIN 40 UNITS: 10 INJECTION, SOLUTION INTRAMUSCULAR; INTRAVENOUS at 10:05

## 2024-04-30 RX ADMIN — Medication 200 MCG: at 09:47

## 2024-04-30 RX ADMIN — FAMOTIDINE 20 MG: 10 INJECTION INTRAVENOUS at 09:10

## 2024-04-30 NOTE — DISCHARGE SUMMARY
OB Discharge Summary        Admit Date:  2024  Date of Delivery: 2024   Discharge Date: 24    Reason for Admission/Chief Complaint: Scheduled     Final Diagnosis:  39+3, repeat  scheduled  Nuchal cord, loose without compression  Moderate meconium  Filmy adhesions of bladder to RADHAMES  AMA  CHTN-no meds  Left carpal tunnel  Unsatisfactory Pap smear, HPV negative w hx ASCUS  To do at FU: FU BP, Pap smear    Antepartum:  Prenatal care is complicated by:  See above Final Diagnosis for list    Intrapartum/Delivery:  OB Surgeon:  Dr. Whitney Santamaria,   Anesthesia: Spinal  Delivery Type:  LTCS  Feeding method: Breast    Infant: male  infant; Arash    Weight: 4350 g (9 lb 9.4 oz)      APGARS: 8  @ 1 minute / 9  @ 5 minutes    Hospital Course/Significant Findings:  Patient arrived for scheduled .  Surgery uncomplicated.  Findings of filmy adhesions of the bladder to the lower uterine segment.  Uncomplicated Postop.   On the day of discharge POSTOP CSECTION tolerating a regular diet, ambulating, pain well controlled, urinating spontaneously and lochia appropriate.   Vital signs were stable and afebrile.  Exam was within normal limits.  Incision was intact, well approximated without signs of infection (Pre[facility]a Wound Management System in place if present).  Abdomen was soft nondistended non-tender.  Fundus was below umbilicus and non-tender.  Meeting discharge criteria and desired discharge home.  Postop instructions and FU reviewed and questions answered.    Results from last 7 days   Lab Units 24  0528 24  0817   WBC 10*3/mm3 11.62* 9.46   HEMOGLOBIN g/dL 10.5* 11.4*   HEMATOCRIT % 33.3* 35.2   PLATELETS 10*3/mm3 266 277       Results from last 7 days   Lab Units 24  0817   GLUCOSE mg/dL 85   CREATININE mg/dL 0.42*   SODIUM mmol/L 136   POTASSIUM mmol/L 3.9   CHLORIDE mmol/L 103   AST (SGOT) U/L 18   ALT (SGPT) U/L 14       Results from last 7 days   Lab Units  04/30/24  0817   TREPONEMA PALLIDUM AB TOTAL  Non-Reactive         Discharge:         Discharge Medications        New Medications        Instructions Start Date   acetaminophen 325 MG tablet  Commonly known as: Tylenol   650 mg, Oral, Every 6 Hours PRN      ibuprofen 800 MG tablet  Commonly known as: ADVIL,MOTRIN   800 mg, Oral, Every 8 Hours PRN             Continue These Medications        Instructions Start Date   fluticasone 50 MCG/ACT nasal spray  Commonly known as: FLONASE   2 sprays, Nasal, Daily      prenatal (CLASSIC) vitamin 28-0.8 MG tablet tablet  Generic drug: prenatal vitamin   Oral, Daily                 Disposition: Home  Diet: Regular    Activity: Pelvic rest 6 weeks    Condition at discharge: Good    Follow up with: Dr. Whitney Santamaria DO or provider of her choice    Follow up in: 1 week BP check      Complications: None      Signature: Daniel Aguilera MD      [facility] LABOR AND DELIVERY  [address] [place] [address] [place]  [facility] [address]  Dept: [phone]  Dept Fax: [phone]  Loc: [phone]

## 2024-04-30 NOTE — PLAN OF CARE
Problem: Adult Inpatient Plan of Care  Goal: Absence of Hospital-Acquired Illness or Injury  Intervention: Prevent and Manage VTE (Venous Thromboembolism) Risk  Goal: Optimal Comfort and Wellbeing  Intervention: Monitor Pain and Promote Comfort     Problem: Pain (Postpartum  Delivery)  Goal: Acceptable Pain Control  Intervention: Prevent or Manage Pain     Problem: Postoperative Nausea and Vomiting (Postpartum  Delivery)  Goal: Nausea and Vomiting Relief  Intervention: Prevent or Manage Postoperative Nausea and Vomiting   Goal Outcome Evaluation:         Care plan reviewed

## 2024-04-30 NOTE — DISCHARGE INSTRUCTIONS
DR. MICHELLE'S POSTOP  DISCHARGE PRECAUTIONS and Answers to FAQs     NO SEX or tampons for SIX weeks.     NO DRIVING for TWO weeks. or while taking narcotic pain medications.     NO TUB BATH or POOL for FOUR weeks, shower only.       NO LIFTING more than 20 pounds for 2 week(s).     STAPLES (if present):  follow up at the office in the next 3-7 days to have them removed if they were not removed before discharge.  If your staples were removed already and steri-strips placed (or you just had steri-strips) REMOVE YOUR STERI STRIPS in TEN DAYS.      INCISION CARE:   WASH DAILY in the shower with soap and water (any type of soap is fine, it does not need to be antibacterial soap).  Look (or have a family member/friend look) at your incision EVERY DAY when you get home.  Keeping the incision DRY is extremely important.  Continue to keep a clean, dry wash cloth (to help wick away moisture) on your incision for 10 days.  Change out the wash cloth frequently (approximately every 2-8 hrs as needed to prevent it from getting moist).  REDNESS, PUS, increase in PAIN, FEVER or CHILLS are all reasons to be seen in our office immediately.  Go to the ER, if it is after hours or a weekend.         VAGINAL BLEEDING:  may continue on and off over the next several weeks after delivery and may increase slightly once you go home.  You should not be bleeding more than 1 large pad soaked every hour or two.  Clots (even the size of a lemon or larger) may be normal as long as the bleeding is not heavy and the clots do not continue.       FEVER or CHILLS or NOT FEELING WELL: call our office.  If the office is closed, you need to be seen in acute care or ER.       CHEST PAIN or SHORTNESS OF BREATH/AIR: you need to GO TO THE NEAREST ER or CALL 911.      SWELLING:  can increase over the next 7-10 days and then should slowly improve.  Your legs/ankles should be fairly similar in size.  A red, painful, hot, swollen leg (usually just one side)  can be a sign of a blood clot and should be evaluated immediately.  Call our office.  If it is after hours or a weekend, you must be seen IMMEDIATELY IN THE ER.      ELEVATED BLOOD PRESSURE:  you need to contact us if you are having  persistent elevated BP systolic (top number) more than 155 or diastolic (bottom number) more than 95, or a headache (not relieved with rest, hydration or over the counter pain reliever), an increase in your swelling (usually hands and face), changes in your vision (typically flashing white or black spots) or severe persistent pain in the location of the upper right side of your belly (under your right breast).  Call our office or go to ER if after hours or a weekend.     LACTATION QUESTIONS or CONCERNS?  Call [facility] Lactation support [phone].     WORK and SCHOOL TIME OFF: depends on your specific delivery type, surrounding circumstances, and your work insurance/school rules.  If you have questions, please call Parul or Carolina at [phone] (ext. [phone]).  Or email Parul at [email or web address].  They will assist in required paperwork for you and/or family members.      For further QUESTIONS or CONCERNS, please call [facility] DARSHAN Burk at [phone].

## 2024-04-30 NOTE — ANESTHESIA PREPROCEDURE EVALUATION
Anesthesia Evaluation     Patient summary reviewed and Nursing notes reviewed   no history of anesthetic complications:   NPO Solid Status: > 8 hours  NPO Liquid Status: > 2 hours           Airway   Mallampati: II  TM distance: >3 FB  Neck ROM: full  No difficulty expected  Dental - normal exam     Pulmonary - negative pulmonary ROS and normal exam    breath sounds clear to auscultation  Cardiovascular - normal exam  Exercise tolerance: good (4-7 METS)    Rhythm: regular  Rate: normal    (+) hypertension      Neuro/Psych- negative ROS  GI/Hepatic/Renal/Endo    (+) GERD well controlled    Musculoskeletal (-) negative ROS    Abdominal   (+) obese   Substance History - negative use     OB/GYN    (+) Pregnant        Other - negative ROS       ROS/Med Hx Other: PAT Nursing Notes unavailable.               Anesthesia Plan    ASA 2     spinal       Anesthetic plan, risks, benefits, and alternatives have been provided, discussed and informed consent has been obtained with: patient.    Plan discussed with CRNA.    CODE STATUS:    Code Status (Patient has no pulse and is not breathing): CPR (Attempt to Resuscitate)  Medical Interventions (Patient has pulse or is breathing): Full

## 2024-04-30 NOTE — PLAN OF CARE
Problem: Adult Inpatient Plan of Care  Goal: Plan of Care Review  4/30/2024 1716 by Jennifer Romo RN  Outcome: Ongoing, Progressing  4/30/2024 1716 by Jennifer Romo RN  Outcome: Ongoing, Progressing  Goal: Patient-Specific Goal (Individualized)  4/30/2024 1716 by Jennifer Romo RN  Outcome: Ongoing, Progressing  4/30/2024 1716 by Jennifer Romo RN  Outcome: Ongoing, Progressing  Goal: Absence of Hospital-Acquired Illness or Injury  4/30/2024 1716 by Jennifer Romo RN  Outcome: Ongoing, Progressing  4/30/2024 1716 by Jennifer Romo RN  Outcome: Ongoing, Progressing  Intervention: Identify and Manage Fall Risk  Description: Perform standard risk assessment on admission using a validated tool or comprehensive approach appropriate to the patient; reassess fall risk frequently, with change in status or transfer to another level of care.  Communicate fall injury risk to interprofessional healthcare team.  Determine need for increased observation, equipment and environmental modification, such as low bed, signage and supportive, nonskid footwear.  Adjust safety measures to individual developmental age, stage and identified risk factors.  Reinforce the importance of safety and physical activity with patient and family.  Perform regular intentional rounding to assess need for position change, pain assessment and personal needs, including assistance with toileting.  Recent Flowsheet Documentation  Taken 4/30/2024 1700 by Jennifer Romo RN  Safety Promotion/Fall Prevention: safety round/check completed  Taken 4/30/2024 1644 by Jennifer Romo RN  Safety Promotion/Fall Prevention: safety round/check completed  Taken 4/30/2024 1500 by Jennifer Romo RN  Safety Promotion/Fall Prevention: safety round/check completed  Taken 4/30/2024 1412 by Jennifer Romo RN  Safety Promotion/Fall Prevention: safety round/check completed  Taken 4/30/2024 1347 by Jennifer Romo RN  Safety Promotion/Fall Prevention: safety  round/check completed  Taken 4/30/2024 1246 by Jennifer Romo RN  Safety Promotion/Fall Prevention: safety round/check completed  Taken 4/30/2024 1145 by Jennifer Romo RN  Safety Promotion/Fall Prevention: safety round/check completed  Taken 4/30/2024 1128 by Jennifer Romo RN  Safety Promotion/Fall Prevention: safety round/check completed  Taken 4/30/2024 1100 by Jennifer Romo RN  Safety Promotion/Fall Prevention: safety round/check completed  Intervention: Prevent and Manage VTE (Venous Thromboembolism) Risk  Description: Assess for VTE (venous thromboembolism) risk.  Encourage and assist with early ambulation.  Initiate and maintain compression or other therapy, as indicated, based on identified risk in accordance with organizational protocol and provider order.  Encourage both active and passive leg exercises while in bed, if unable to ambulate.  Recent Flowsheet Documentation  Taken 4/30/2024 1347 by Jennifer Romo RN  VTE Prevention/Management:   bilateral   sequential compression devices on  Taken 4/30/2024 0732 by Jennifer Romo RN  VTE Prevention/Management:   bilateral   sequential compression devices on  Goal: Optimal Comfort and Wellbeing  4/30/2024 1716 by Jennifer Romo RN  Outcome: Ongoing, Progressing  4/30/2024 1716 by Jennifer Romo RN  Outcome: Ongoing, Progressing  Goal: Readiness for Transition of Care  4/30/2024 1716 by Jennifer Romo RN  Outcome: Ongoing, Progressing  4/30/2024 1716 by Jennifer Romo RN  Outcome: Ongoing, Progressing  Intervention: Mutually Develop Transition Plan  Description: Identify available resources for support (e.g., family, friends, community).  Identify and address barriers to ongoing treatment and home management (e.g., environmental, financial).  Provide opportunities to practice self-management skills.  Assess and monitor emotional readiness for transition.  Establish or reconnect linkage with outpatient providers or community-based  services.  Recent Flowsheet Documentation  Taken 2024 0832 by Jennifer Romo RN  Equipment Currently Used at Home: none  Taken 2024 0819 by Jennifer Romo RN  Transportation Anticipated: family or friend will provide  Patient/Family Anticipated Services at Transition: none  Patient/Family Anticipates Transition to: home     Problem: Infection ( Delivery)  Goal: Absence of Infection Signs and Symptoms  2024 1716 by Jennifer Romo RN  Outcome: Ongoing, Progressing  2024 1716 by Jennifer Romo RN  Outcome: Ongoing, Progressing     Problem: Adjustment to Role Transition (Postpartum  Delivery)  Goal: Successful Maternal Role Transition  Outcome: Ongoing, Progressing     Problem: Bleeding (Postpartum  Delivery)  Goal: Hemostasis  Outcome: Ongoing, Progressing     Problem: Infection (Postpartum  Delivery)  Goal: Absence of Infection Signs and Symptoms  Outcome: Ongoing, Progressing     Problem: Pain (Postpartum  Delivery)  Goal: Acceptable Pain Control  Outcome: Ongoing, Progressing     Problem: Postoperative Nausea and Vomiting (Postpartum  Delivery)  Goal: Nausea and Vomiting Relief  Outcome: Ongoing, Progressing     Problem: Postoperative Urinary Retention (Postpartum  Delivery)  Goal: Effective Urinary Elimination  Outcome: Ongoing, Progressing   Goal Outcome Evaluation:

## 2024-04-30 NOTE — H&P (VIEW-ONLY)
OB HISTORY AND PHYSICAL      SUBJECTIVE:    38 y.o. female  currently at 39w2d PN complicated by:      Patient Active Problem List    Diagnosis     Chronic hypertension [I10]     Supervision of other normal pregnancy, antepartum [Z34.80]     Antepartum multigravida of advanced maternal age [O09.529]     Previous  section [Z98.891]        CC/HPI:  Presents with Scheduled CS.     ROS: No leaking fluid, No vaginal bleeding, No contractions, Is feeling adequate FM, No HA, No scotomata or vision changes, and No RUQ/epigastric pain    Past OB History:   OB History    Para Term  AB Living   3 2 2     2   SAB IAB Ectopic Molar Multiple Live Births           0 2      # Outcome Date GA Lbr Del/2nd Weight Sex Type Anes PTL Lv   3 Current            2 Term 07/15/20 39w6d  3600 g (7 lb 15 oz) M CS-LTranv Spinal [place]   1 Term 18 40w3d 07:55 / 04:13 3175 g (7 lb) M CS-LTranv EPI N [place]      Birth Comments: panda OR 1        Prenatal Labs:    Prenatal Results       Initial Prenatal Labs       Test Value Reference Range Date Time    Hemoglobin  14.3 g/dL 12.0 - 15.9 23 09    Hematocrit  42.7 % 34.0 - 46.6 23 0939    Platelets  309 10*3/mm3 140 - 450 23 0939    Rubella IgG  4.32 index Immune >0.99 23 0939    Hepatitis B SAg  Non-Reactive  Non-Reactive 23 0939    Hepatitis C Ab  Non-Reactive  Non-Reactive 23 0939    RPR ^ Non-Reactive   18     T. Pallidum Ab   Non-Reactive  Non-Reactive 23 0939    ABO  O   23 0939    Rh  Positive   23 0939    Antibody Screen  Negative   23 09    HIV  Non-Reactive  Non-Reactive 23 0939    Urine Culture  50,000 CFU/mL Mixed Priscilla Isolated   23    Gonorrhea  Negative  Negative 23    Chlamydia  Negative  Negative 23    TSH  1.810 uIU/mL 0.270 - 4.200 23 1102    HgB A1c         Varicella IgG        HgB Electrophoresis         Cystic fibrosis                    Fetal testing        Test Value Reference Range Date Time    NIPT        MSAFP        AFP-4                  2nd and 3rd Trimester       Test Value Reference Range Date Time    Hemoglobin (repeated)  12.4 g/dL 12.0 - 15.9 24 1025    Hematocrit (repeated)  35.4 % 34.0 - 46.6 24 1025    Platelets   242 10*3/mm3 140 - 450 24 1025       309 10*3/mm3 140 - 450 23 0939    GCT  113 mg/dL 65 - 139 24 1025    Antibody Screen (repeated)        3rd TM syphilis scrn (repeated)  RPR         3rd TM syphilis scrn (repeated) FTA        GTT Fasting        GTT 1 Hr        GTT 2 Hr        GTT 3 Hr        Group B Strep  Negative  Negative 24 1526                 PMHx:    Past Medical History:   Diagnosis Date    Chronic hypertension        Medications:  fluticasone and prenatal vitamin    Allergies:  No Known Allergies    PSHx:    Past Surgical History:   Procedure Laterality Date     SECTION Bilateral 2018    Procedure:  SECTION PRIMARY;  Surgeon: Carol Tillman MD;  Location: [facility] LABOR DELIVERY;  Service: Obstetrics/Gynecology    WISDOM TOOTH EXTRACTION         Social History:    reports that she has never smoked. She has never been exposed to tobacco smoke. She has never used smokeless tobacco. She reports that she does not drink alcohol and does not use drugs.    Family History: Non contributory    Immunizations: See prenatal record for Tdap, Flu, Covid and/or other vaccinations    PHYSICAL EXAM:     General- NAD, alert and oriented, appropriate  Psych- normal mood, good memory  Cardiovascular- Regular rhythm, no murnurs  Respiratory- CTA to bases, no wheezes  Abdomen- Gravid, non tender  Fundus-  Non tender.  Size: consistent with dates  Presentation- VTX, by US, on 2024  Extremities/DTRs- No edema, bilaterally equal, no signs of DVT    Fetal HR: Doptones 110-160, see last OV note  Contractions: Not complaining of any regular  contractions      ASSESSMENT:  39w2d  Scheduled CS, prior  x 2  CHTN-no medications  AMA  Unsatisfactory Pap, HPV negative    Lab Results   Component Value Date    STREPGPB Negative 2024       PLAN:  Admit  Delivery:     Plan of care [facility] hospital course, R/B/A/potential SE, suspected length have been reviewed with patient and any family or friends present, questions answered to her/his/their satisfaction.  Pt desires to proceed as above.    Counseling:The patient was counseled on the risks, benefits and alternatives of a .  Risks reviewed, but are not limited to: anesthesia, bleeding, transfusion, hysterectomy, infection, damage to organs, reoperation, wound separation, blood clots, and death.  She declines the alternatives and desires a .  All her questions have been answered to her satisfaction and she desires to proceed.         Electronically signed by Whitney Santamaria DO, 24, 8:14 PM EDT.    [facility] ORDERS ONLY  [address]  [place] [address]  Dept: [phone]  Loc: [phone]

## 2024-04-30 NOTE — OP NOTE
OB Operative Note        Date of Service:  24     Pre-Operative Dx:   IUP at Gestational Age: 39w3d  weeks    indication: Prior  x 2  Scheduled repeat   CHTN  AMA    Postoperative dx:   Same as above  Loose nuchal cord  Moderate meconium    Procedure:   Repeat LTCS, Lysis of adhesions    Surgeon:  Whitney Santamaria DO    Assistant:  Dr. Daniel Aguilera [facility] Hospitalist was responsible for performing the following activities:   Retraction, Suction, Irrigation, Fundal pressure to assist with delivery of the fetus, Closure of the right side of the fascia and their skilled assistance was necessary for the success of this case.     No resident assist available.    Anesthesia:  Spinal  CRNA: Masood Wilder CRNA    Estimated Blood Loss: 600 mls    Findings:   Normal uterus, Normal tubes, Normal ovaries, Normal placenta, centrally inserting cord, Adhesions: Filmy, bladder to lower uterine segment.    Infant: Gender:  male  infant   Weight:   4350 g (9 lb 9.4 oz)    APGARS:  8  @ 1 minute / 9  @ 5 minutes    Specimens:    Cord blood, cord gases    Procedure Details:  The patient was brought to the operating room and spinal anesthesia was deemed to be adequate.  She was prepped and draped in a normal sterile fashion and placed in supine position with a leftward tilt.  A Pfannenstiel skin incision was made approximately 2 fingerbreadths above the symphysis pubis and carried down to the underlying layer of fascia.  Her old skin incision was excised and discarded.  The fascia was nicked in the midline and extended laterally with Mckenzie scissors.  The superior and inferior aspects of the fascial incision were grasped with Kocher clamps and the underlying rectus muscle was dissected off bluntly.  The midline was identified and opened in a sharp fashion with no noted damage to underlying bowel, bladder, blood vessels, or organs.   The vesicouterine peritoneum was incised and the bladder flap was created. The  bladder was noted to be filmy adhesed to the lower half of the uterus.  Adhesions were easily lysed sharply and then with blunt dissection.  The lower uterine segment was incised in a transverse fashion and extended laterally with bandage scissors.  Fluid was noted to be meconium, moderate.  The fetal vertex was brought up atraumatically through the uterine incision.  The mouth and nares were bulb suctioned.  There was a loose nuchal cord which was easily reduced over the fetal vertex.  The left anterior and right posterior shoulders were delivered easily.  The remainder of the body delivered.  The infant was vigorous.  The cord was clamped and cut after a delay of 60 seconds and the infant was handed off to [facility] baby [facility].  A segment of cord was handed off to the technician for collection of cord blood and cord gases.  The placenta delivered with the assistance of fundal massage and was discarded.  The uterus was exteriorized and cleared of all clots and debris.  The uterine incision was repaired with 0 Vicryl in a running fashion.  A second imbricating stitch was placed.  Excellent hemostasis was assured.  Cytotec 200mcg SL and 200mcg PO given to assist w uterine tone.     The uterus was placed back into the pelvic cavity.  Copious irrigation was performed.  The gutters were cleared of all clot and debris.  The uterine incision was reinspected off tension and noted to be hemostatic.  Interceed was placed over the uterine incision and anterior uterus.  The parietal peritoneum was closed.  The rectus muscles were reapproximated in the midline.  The rectus muscles and fascia were noted to be hemostatic.  The fascia was closed with 0 Vicryl in a running fashion starting at the bilateral angles and to the midline.  The subcutaneous tissue was copiously irrigated and made hemostatic.  It was reapproximated using monocryl and the skin was closed with staples.  Urine was clear at the end of the procedure.     The  patient tolerated the procedure well.  Instrument, lap, and needle counts were correct.  The patient received antibiotics prior to the procedure, Kefzol.  She was taken to the recovery room in stable condition.      Complications: None    Condition: Good    Disposition:  PACU          Electronically signed by:  Wihtney Santamaria DO, 04/30/24, 11:02 AM EDT.

## 2024-04-30 NOTE — H&P
OB HISTORY AND PHYSICAL      SUBJECTIVE:    38 y.o. female  currently at 39w2d PN complicated by:      Patient Active Problem List    Diagnosis     Chronic hypertension [I10]     Supervision of other normal pregnancy, antepartum [Z34.80]     Antepartum multigravida of advanced maternal age [O09.529]     Previous  section [Z98.891]        CC/HPI:  Presents with Scheduled CS.     ROS: No leaking fluid, No vaginal bleeding, No contractions, Is feeling adequate FM, No HA, No scotomata or vision changes, and No RUQ/epigastric pain    Past OB History:   OB History    Para Term  AB Living   3 2 2     2   SAB IAB Ectopic Molar Multiple Live Births           0 2      # Outcome Date GA Lbr Del/2nd Weight Sex Type Anes PTL Lv   3 Current            2 Term 07/15/20 39w6d  3600 g (7 lb 15 oz) M CS-LTranv Spinal N [place]   1 Term 18 40w3d 07:55 / 04:13 3175 g (7 lb) M CS-LTranv EPI N [place]      Birth Comments: panda OR 1        Prenatal Labs:    Prenatal Results       Initial Prenatal Labs       Test Value Reference Range Date Time    Hemoglobin  14.3 g/dL 12.0 - 15.9 23 09    Hematocrit  42.7 % 34.0 - 46.6 23 0939    Platelets  309 10*3/mm3 140 - 450 23 0939    Rubella IgG  4.32 index Immune >0.99 23 0939    Hepatitis B SAg  Non-Reactive  Non-Reactive 23 0939    Hepatitis C Ab  Non-Reactive  Non-Reactive 23 0939    RPR ^ Non-Reactive   18     T. Pallidum Ab   Non-Reactive  Non-Reactive 23 0939    ABO  O   23 0939    Rh  Positive   23 0939    Antibody Screen  Negative   23 09    HIV  Non-Reactive  Non-Reactive 23 0939    Urine Culture  50,000 CFU/mL Mixed Priscilla Isolated   23    Gonorrhea  Negative  Negative 23    Chlamydia  Negative  Negative 23    TSH  1.810 uIU/mL 0.270 - 4.200 23 1102    HgB A1c         Varicella IgG        HgB Electrophoresis         Cystic fibrosis                    Fetal testing        Test Value Reference Range Date Time    NIPT        MSAFP        AFP-4                  2nd and 3rd Trimester       Test Value Reference Range Date Time    Hemoglobin (repeated)  12.4 g/dL 12.0 - 15.9 24 1025    Hematocrit (repeated)  35.4 % 34.0 - 46.6 24 1025    Platelets   242 10*3/mm3 140 - 450 24 1025       309 10*3/mm3 140 - 450 23 0939    GCT  113 mg/dL 65 - 139 24 1025    Antibody Screen (repeated)        3rd TM syphilis scrn (repeated)  RPR         3rd TM syphilis scrn (repeated) FTA        GTT Fasting        GTT 1 Hr        GTT 2 Hr        GTT 3 Hr        Group B Strep  Negative  Negative 24 1526                 PMHx:    Past Medical History:   Diagnosis Date    Chronic hypertension        Medications:  fluticasone and prenatal vitamin    Allergies:  No Known Allergies    PSHx:    Past Surgical History:   Procedure Laterality Date     SECTION Bilateral 2018    Procedure:  SECTION PRIMARY;  Surgeon: Carol Tillman MD;  Location: [facility] LABOR DELIVERY;  Service: Obstetrics/Gynecology    WISDOM TOOTH EXTRACTION         Social History:    reports that she has never smoked. She has never been exposed to tobacco smoke. She has never used smokeless tobacco. She reports that she does not drink alcohol and does not use drugs.    Family History: Non contributory    Immunizations: See prenatal record for Tdap, Flu, Covid and/or other vaccinations    PHYSICAL EXAM:     General- NAD, alert and oriented, appropriate  Psych- normal mood, good memory  Cardiovascular- Regular rhythm, no murnurs  Respiratory- CTA to bases, no wheezes  Abdomen- Gravid, non tender  Fundus-  Non tender.  Size: consistent with dates  Presentation- VTX, by US, on 2024  Extremities/DTRs- No edema, bilaterally equal, no signs of DVT    Fetal HR: Doptones 110-160, see last OV note  Contractions: Not complaining of any regular  contractions      ASSESSMENT:  39w2d  Scheduled CS, prior  x 2  CHTN-no medications  AMA  Unsatisfactory Pap, HPV negative    Lab Results   Component Value Date    STREPGPB Negative 2024       PLAN:  Admit  Delivery:     Plan of care [facility] hospital course, R/B/A/potential SE, suspected length have been reviewed with patient and any family or friends present, questions answered to her/his/their satisfaction.  Pt desires to proceed as above.    Counseling:The patient was counseled on the risks, benefits and alternatives of a .  Risks reviewed, but are not limited to: anesthesia, bleeding, transfusion, hysterectomy, infection, damage to organs, reoperation, wound separation, blood clots, and death.  She declines the alternatives and desires a .  All her questions have been answered to her satisfaction and she desires to proceed.         Electronically signed by Whitney Santamaria DO, 24, 8:14 PM EDT.    MARCUS FIERRO [facility] ORDERS ONLY  [address]  [place] [address]  Dept: [phone]  Loc: [phone]

## 2024-05-01 LAB
BASOPHILS # BLD AUTO: 0.06 10*3/MM3 (ref 0–0.2)
BASOPHILS NFR BLD AUTO: 0.5 % (ref 0–1.5)
DEPRECATED RDW RBC AUTO: 44.4 FL (ref 37–54)
EOSINOPHIL # BLD AUTO: 0.11 10*3/MM3 (ref 0–0.4)
EOSINOPHIL NFR BLD AUTO: 0.9 % (ref 0.3–6.2)
ERYTHROCYTE [DISTWIDTH] IN BLOOD BY AUTOMATED COUNT: 13.6 % (ref 12.3–15.4)
HCT VFR BLD AUTO: 33.3 % (ref 34–46.6)
HGB BLD-MCNC: 10.5 G/DL (ref 12–15.9)
IMM GRANULOCYTES # BLD AUTO: 0.05 10*3/MM3 (ref 0–0.05)
IMM GRANULOCYTES NFR BLD AUTO: 0.4 % (ref 0–0.5)
LYMPHOCYTES # BLD AUTO: 2.12 10*3/MM3 (ref 0.7–3.1)
LYMPHOCYTES NFR BLD AUTO: 18.2 % (ref 19.6–45.3)
MCH RBC QN AUTO: 28.1 PG (ref 26.6–33)
MCHC RBC AUTO-ENTMCNC: 31.5 G/DL (ref 31.5–35.7)
MCV RBC AUTO: 89 FL (ref 79–97)
MONOCYTES # BLD AUTO: 0.88 10*3/MM3 (ref 0.1–0.9)
MONOCYTES NFR BLD AUTO: 7.6 % (ref 5–12)
NEUTROPHILS NFR BLD AUTO: 72.4 % (ref 42.7–76)
NEUTROPHILS NFR BLD AUTO: 8.4 10*3/MM3 (ref 1.7–7)
NRBC BLD AUTO-RTO: 0 /100 WBC (ref 0–0.2)
PLATELET # BLD AUTO: 266 10*3/MM3 (ref 140–450)
PMV BLD AUTO: 11.1 FL (ref 6–12)
RBC # BLD AUTO: 3.74 10*6/MM3 (ref 3.77–5.28)
WBC NRBC COR # BLD AUTO: 11.62 10*3/MM3 (ref 3.4–10.8)

## 2024-05-01 PROCEDURE — 0503F POSTPARTUM CARE VISIT: CPT | Performed by: OBSTETRICS & GYNECOLOGY

## 2024-05-01 PROCEDURE — 85025 COMPLETE CBC W/AUTO DIFF WBC: CPT | Performed by: OBSTETRICS & GYNECOLOGY

## 2024-05-01 PROCEDURE — 25010000002 KETOROLAC TROMETHAMINE PER 15 MG: Performed by: OBSTETRICS & GYNECOLOGY

## 2024-05-01 PROCEDURE — 25010000002 ENOXAPARIN PER 10 MG: Performed by: OBSTETRICS & GYNECOLOGY

## 2024-05-01 RX ORDER — IBUPROFEN 800 MG/1
800 TABLET ORAL EVERY 8 HOURS PRN
Qty: 30 TABLET | Refills: 1 | Status: SHIPPED | OUTPATIENT
Start: 2024-05-01

## 2024-05-01 RX ORDER — ACETAMINOPHEN 325 MG/1
650 TABLET ORAL EVERY 6 HOURS PRN
Qty: 30 TABLET | Refills: 1 | Status: SHIPPED | OUTPATIENT
Start: 2024-05-01

## 2024-05-01 RX ADMIN — DOCUSATE SODIUM 100 MG: 100 CAPSULE, LIQUID FILLED ORAL at 08:42

## 2024-05-01 RX ADMIN — KETOROLAC TROMETHAMINE 15 MG: 15 INJECTION, SOLUTION INTRAMUSCULAR; INTRAVENOUS at 10:30

## 2024-05-01 RX ADMIN — ACETAMINOPHEN 1000 MG: 500 TABLET ORAL at 08:42

## 2024-05-01 RX ADMIN — OXYCODONE HYDROCHLORIDE 10 MG: 5 TABLET ORAL at 08:43

## 2024-05-01 RX ADMIN — IBUPROFEN 600 MG: 600 TABLET, FILM COATED ORAL at 23:45

## 2024-05-01 RX ADMIN — KETOROLAC TROMETHAMINE 15 MG: 15 INJECTION, SOLUTION INTRAMUSCULAR; INTRAVENOUS at 05:00

## 2024-05-01 RX ADMIN — ACETAMINOPHEN 650 MG: 325 TABLET ORAL at 21:51

## 2024-05-01 RX ADMIN — ENOXAPARIN SODIUM 40 MG: 100 INJECTION SUBCUTANEOUS at 10:30

## 2024-05-01 RX ADMIN — Medication 10 ML: at 08:43

## 2024-05-01 RX ADMIN — ACETAMINOPHEN 650 MG: 325 TABLET ORAL at 15:28

## 2024-05-01 RX ADMIN — ACETAMINOPHEN 1000 MG: 500 TABLET ORAL at 03:07

## 2024-05-01 RX ADMIN — IBUPROFEN 600 MG: 600 TABLET, FILM COATED ORAL at 17:17

## 2024-05-01 NOTE — LACTATION NOTE
LC in to follow up with lactation progress. Patient continues to pump every 3 hours and is getting around 3 ml / pumping session. Baby is now rooming in with her and supplementing with formula. At this visit baby is not ready to attempt to breastfeed. Mom states she is motivated to breastfeed when it is a good time.

## 2024-05-01 NOTE — ANESTHESIA POSTPROCEDURE EVALUATION
Patient: Shelby Jon    Procedure Summary       Date: 24 Room / Location: [facility] LABOR DELIVERY  [facility] LABOR DELIVERY    Anesthesia Start: 934 Anesthesia Stop:     Procedure:  SECTION REPEAT (Abdomen) Diagnosis: (repeat c/s with kangaroo care in OR)    Surgeons: Whitney Santamaria DO Provider: Masood Wilder CRNA    Anesthesia Type: spinal ASA Status: 2            Anesthesia Type: spinal    Vitals  Vitals Value Taken Time   /62 24 0504   Temp 36.5 °C (97.7 °F) 24 0504   Pulse 64 24 0504   Resp 16 24 0504   SpO2 96 % 24 1215           Post Anesthesia Care and Evaluation    Patient location during evaluation: bedside  Patient participation: complete - patient participated  Level of consciousness: awake  Pain score: 0  Pain management: adequate    Airway patency: patent  Anesthetic complications: No anesthetic complications  PONV Status: controlled  Cardiovascular status: acceptable and stable  Respiratory status: acceptable  Hydration status: acceptable  Post Neuraxial Block status: Motor and sensory function returned to baseline and No signs or symptoms of PDPH

## 2024-05-01 NOTE — PROGRESS NOTES
PostPartum/PostOp PROGRESS NOTE        Subjective:  Complains intermittent left hand numb, pain, at times.  Started in the last 1/2 of her preg.  Worsened immediately after CS yesterday.  Today has improved.  No CP or SOA.  No numbness or pain anywhere else.  Pain controlled  Tolerating a regular diet  Passing flatus  Ambulating  Urinating spontaneously  Lochia decreasing, no bleeding concerns  Denies HA, vision change, or RUQ/epigastric pain  No lightheadedness or dizziness  Baby in NICU doing well by am nursing report, she is coping well w the separation    Objective:     Temp:  [97.3 °F (36.3 °C)-97.7 °F (36.5 °C)] 97.7 °F (36.5 °C)  Heart Rate:  [59-78] 64  Resp:  [16-18] 16  BP: ()/(46-68) 103/62  General- NAD, alert and oriented, appropriate  Psych- Normal mood, good memory  Cardiovascular/Respiratory- CV- Regular rhythm, no murnurs, Resp- CTA to bases, no wheezes  Abdomen- Fundus firm, non tender, Soft, non distended, non tender, normal active bowel sounds, Bandage removed, Incision clean, dry, intact, Staples in place, and Fundus at U-1  Extremties/DTRs- Trace edema, bilaterally equal, no signs of DVT    Results from last 7 days   Lab Units 05/01/24  0528 04/30/24  0817   WBC 10*3/mm3 11.62* 9.46   HEMOGLOBIN g/dL 10.5* 11.4*   HEMATOCRIT % 33.3* 35.2   PLATELETS 10*3/mm3 266 277       Results from last 7 days   Lab Units 04/30/24  0817   GLUCOSE mg/dL 85   CREATININE mg/dL 0.42*   SODIUM mmol/L 136   POTASSIUM mmol/L 3.9   CHLORIDE mmol/L 103   AST (SGOT) U/L 18   ALT (SGPT) U/L 14            Results from last 7 days   Lab Units 04/30/24  0817   TREPONEMA PALLIDUM AB TOTAL  Non-Reactive     Assessment:    Post-partum/postop Day:  1  The patient is currently breastfeeding.  CHTN no meds  Suspect carpal tunnel      Plan:   Routine postpartum/postop care.  Discussed carpal tunnel and conservative measures, braces can be used.  If persists 4+weeks PP, rec fu PCP  Remove IV  Remove bandage  Shower  PO pain  meds  Importance of wound care/keep clean and dry  Remove staples on day of DC, place steris  Breast feeding support  DC meds reviewed  Follow up scheduled  PP/PO precautions given  HTN precautions reviewed in detail.  Questions answered.  RTO/ER for HA not relieved w tylenol, vision changes, epig/RUQ pain, or Bps elevated at home.  OB Hospitalist will see pt tomorrow.  Plan discharge on tomorrow, staples out tomorrow              Electronically signed by Whitney Santamaria DO, 05/01/24, 7:49 AM EDT.

## 2024-05-01 NOTE — LACTATION NOTE
LC in to see this P3 patient whose infant has been admitted to the NICU.  LC assisted with her first pumping session. LC encouraged her to not get discouraged about the small amount of milk pumped out. LC discussed normal expectations of pumping during the first week and why there is still the need to pump for hormonal stimulation. LC discussed that even drops are good for baby to get and provided small syringes and oral swabs to collect these drops that she is getting and to take to baby. ARLENE discussed labeling and storage of milk/colosrum while baby is in the NICU. LC placed a pumping schedule on her dry erase board to help her and support staff to keep track of pumping times. LC discussed good hands on pumping guidelines. Mom expressed understanding

## 2024-05-01 NOTE — PLAN OF CARE
Problem: Adult Inpatient Plan of Care  Goal: Plan of Care Review  Outcome: Ongoing, Progressing  Goal: Patient-Specific Goal (Individualized)  Outcome: Ongoing, Progressing  Goal: Absence of Hospital-Acquired Illness or Injury  Outcome: Ongoing, Progressing  Intervention: Identify and Manage Fall Risk  Recent Flowsheet Documentation  Taken 5/1/2024 1700 by Adry Reeder RN  Safety Promotion/Fall Prevention: safety round/check completed  Taken 5/1/2024 1530 by Adry Reeder RN  Safety Promotion/Fall Prevention: safety round/check completed  Taken 5/1/2024 1450 by Adry Reeder RN  Safety Promotion/Fall Prevention: safety round/check completed  Taken 5/1/2024 1315 by Adry Reeder RN  Safety Promotion/Fall Prevention: safety round/check completed  Taken 5/1/2024 1122 by Adry Reeder RN  Safety Promotion/Fall Prevention: safety round/check completed  Taken 5/1/2024 1015 by Adry Reeder RN  Safety Promotion/Fall Prevention: safety round/check completed  Taken 5/1/2024 0900 by Adry Reeder RN  Safety Promotion/Fall Prevention: safety round/check completed  Taken 5/1/2024 0715 by Adry Reeder RN  Safety Promotion/Fall Prevention: safety round/check completed  Intervention: Prevent Skin Injury  Recent Flowsheet Documentation  Taken 5/1/2024 0900 by Adry Reeder RN  Body Position: position changed independently  Intervention: Prevent and Manage VTE (Venous Thromboembolism) Risk  Recent Flowsheet Documentation  Taken 5/1/2024 0900 by Adry Reeder RN  Activity Management: up ad arnold  VTE Prevention/Management: (patient currently up ad arnold in room) sequential compression devices off  Goal: Optimal Comfort and Wellbeing  Outcome: Ongoing, Progressing  Intervention: Monitor Pain and Promote Comfort  Recent Flowsheet Documentation  Taken 5/1/2024 0843 by Adry Reeder RN  Pain Management Interventions: see MAR  Intervention: Provide Person-Centered Care  Recent  Flowsheet Documentation  Taken 2024 0900 by Adry Reeder, RN  Trust Relationship/Rapport:   care explained   choices provided   emotional support provided   empathic listening provided   questions answered   questions encouraged   reassurance provided   thoughts/feelings acknowledged  Goal: Readiness for Transition of Care  Outcome: Ongoing, Progressing     Problem: Infection ( Delivery)  Goal: Absence of Infection Signs and Symptoms  Outcome: Ongoing, Progressing     Problem: Adjustment to Role Transition (Postpartum  Delivery)  Goal: Successful Maternal Role Transition  Outcome: Ongoing, Progressing     Problem: Bleeding (Postpartum  Delivery)  Goal: Hemostasis  Outcome: Ongoing, Progressing     Problem: Infection (Postpartum  Delivery)  Goal: Absence of Infection Signs and Symptoms  Outcome: Ongoing, Progressing     Problem: Pain (Postpartum  Delivery)  Goal: Acceptable Pain Control  Outcome: Ongoing, Progressing  Intervention: Prevent or Manage Pain  Recent Flowsheet Documentation  Taken 2024 0843 by Adry Reeder, RN  Pain Management Interventions: see MAR     Problem: Postoperative Nausea and Vomiting (Postpartum  Delivery)  Goal: Nausea and Vomiting Relief  Outcome: Ongoing, Progressing     Problem: Postoperative Urinary Retention (Postpartum  Delivery)  Goal: Effective Urinary Elimination  Outcome: Ongoing, Progressing   Goal Outcome Evaluation:      Patient doing well. Self care. Up ad arnold.

## 2024-05-02 VITALS
RESPIRATION RATE: 18 BRPM | TEMPERATURE: 97.3 F | OXYGEN SATURATION: 96 % | HEIGHT: 63 IN | BODY MASS INDEX: 36.6 KG/M2 | HEART RATE: 87 BPM | DIASTOLIC BLOOD PRESSURE: 65 MMHG | SYSTOLIC BLOOD PRESSURE: 120 MMHG

## 2024-05-02 PROCEDURE — 25010000002 ENOXAPARIN PER 10 MG: Performed by: OBSTETRICS & GYNECOLOGY

## 2024-05-02 RX ADMIN — IBUPROFEN 600 MG: 600 TABLET, FILM COATED ORAL at 05:13

## 2024-05-02 RX ADMIN — ACETAMINOPHEN 650 MG: 325 TABLET ORAL at 10:06

## 2024-05-02 RX ADMIN — ACETAMINOPHEN 650 MG: 325 TABLET ORAL at 03:06

## 2024-05-02 RX ADMIN — IBUPROFEN 600 MG: 600 TABLET, FILM COATED ORAL at 11:27

## 2024-05-02 RX ADMIN — ENOXAPARIN SODIUM 40 MG: 100 INJECTION SUBCUTANEOUS at 11:27

## 2024-05-02 NOTE — PLAN OF CARE
Problem: Adult Inpatient Plan of Care  Goal: Plan of Care Review  Outcome: Ongoing, Progressing  Goal: Patient-Specific Goal (Individualized)  Outcome: Ongoing, Progressing  Goal: Absence of Hospital-Acquired Illness or Injury  Outcome: Ongoing, Progressing  Intervention: Identify and Manage Fall Risk  Recent Flowsheet Documentation  Taken 2024 by Lizz Perla RNA  Safety Promotion/Fall Prevention: safety round/check completed  Intervention: Prevent and Manage VTE (Venous Thromboembolism) Risk  Recent Flowsheet Documentation  Taken 2024 by Lizz Perla RNA  VTE Prevention/Management: patient refused intervention  Goal: Optimal Comfort and Wellbeing  Outcome: Ongoing, Progressing  Intervention: Monitor Pain and Promote Comfort  Recent Flowsheet Documentation  Taken 2024 by Minda, Carolina A, RNA  Pain Management Interventions: see MAR  Goal: Readiness for Transition of Care  Outcome: Ongoing, Progressing     Problem: Infection ( Delivery)  Goal: Absence of Infection Signs and Symptoms  Outcome: Ongoing, Progressing     Problem: Adjustment to Role Transition (Postpartum  Delivery)  Goal: Successful Maternal Role Transition  Outcome: Ongoing, Progressing     Problem: Bleeding (Postpartum  Delivery)  Goal: Hemostasis  Outcome: Ongoing, Progressing     Problem: Infection (Postpartum  Delivery)  Goal: Absence of Infection Signs and Symptoms  Outcome: Ongoing, Progressing     Problem: Pain (Postpartum  Delivery)  Goal: Acceptable Pain Control  Outcome: Ongoing, Progressing  Intervention: Prevent or Manage Pain  Recent Flowsheet Documentation  Taken 2024 by Minda, Carolina A, RNA  Pain Management Interventions: see MAR     Problem: Postoperative Nausea and Vomiting (Postpartum  Delivery)  Goal: Nausea and Vomiting Relief  Outcome: Ongoing, Progressing     Problem: Postoperative Urinary Retention (Postpartum   Delivery)  Goal: Effective Urinary Elimination  Outcome: Ongoing, Progressing   Goal Outcome Evaluation:    Patient progressing well. Tolerating pain well with no additional medication. Patient ambulates well and VS/Lochia/Fundal height WNL

## 2024-05-02 NOTE — LACTATION NOTE
LC in to assist with this first breastfeeding session. Baby was also circumcised today and was very sleepy with poor effort shown. LC attempted to keep baby awake at the breast but was not successful. LC discussed with mother that it will be important to continue to pump with each infant feeding and encouraged her to f/u with lactation after discharge. Patient is planning on discharge today. LC discussed normal infant output patterns to expect and  if infant is not waking by 3 hours to wake and feed using measures shown in the hospital. LC discussed checking to make sure new medications are safe to breastfeed. LC discussed alcohol use and cigarette/second hand smoke around baby and breastfeeding and discussed the impact of street drugs on infants and breastfeeding. LC used the page in the breastfeeding guide to discuss harmful effects of these. Breastfeeding/Lactation expectations and anticipatory guidance discussed for the next two weeks . LC discussed nipple care, plugged ducts, engorgement, and breast infection. LC encouraged mom to see pediatrician two days from discharge for follow up. Patient has a breastpump for home use and LC discussed good pumping guidelines and normal expectations with pumping and storage and preparation of ebm for feedings. LC discussed breastfeeding/lactation resources including the local breastfeeding support group after discharge and when to call the doctor. Patient showed good understanding.

## 2024-05-02 NOTE — NURSING NOTE
Patient and infant discharged home; band identified and confirmed by patient; code alert removed.

## 2024-05-05 PROBLEM — Z98.891 H/O: C-SECTION: Status: RESOLVED | Noted: 2024-04-30 | Resolved: 2024-05-05

## 2024-05-06 ENCOUNTER — TELEPHONE (OUTPATIENT)
Dept: LACTATION | Facility: HOSPITAL | Age: 39
End: 2024-05-06
Payer: COMMERCIAL

## 2024-05-06 ENCOUNTER — POSTPARTUM VISIT (OUTPATIENT)
Dept: OBSTETRICS AND GYNECOLOGY | Facility: CLINIC | Age: 39
End: 2024-05-06
Payer: COMMERCIAL

## 2024-05-06 VITALS
SYSTOLIC BLOOD PRESSURE: 134 MMHG | DIASTOLIC BLOOD PRESSURE: 83 MMHG | BODY MASS INDEX: 33.98 KG/M2 | HEIGHT: 63 IN | HEART RATE: 78 BPM | WEIGHT: 191.8 LBS

## 2024-05-06 DIAGNOSIS — I10 CHRONIC HYPERTENSION: Primary | ICD-10-CM

## 2024-05-06 PROCEDURE — 99212 OFFICE O/P EST SF 10 MIN: CPT | Performed by: OBSTETRICS & GYNECOLOGY

## 2024-05-13 ENCOUNTER — MATERNAL SCREENING (OUTPATIENT)
Dept: CALL CENTER | Facility: HOSPITAL | Age: 39
End: 2024-05-13
Payer: COMMERCIAL

## 2024-05-13 ENCOUNTER — HOSPITAL ENCOUNTER (OUTPATIENT)
Dept: LACTATION | Facility: HOSPITAL | Age: 39
Discharge: HOME | End: 2024-05-13
Payer: COMMERCIAL

## 2024-05-13 NOTE — LACTATION NOTE
Pt  here with her 13 day old son for help with exclusively breastfeeding. Pt states baby is feeding at the breast every 2-3 hours for 30 min on each side, she is supplemental feeding once a day with 1-2oz of pumped EBM. When ask about feeding at the breast she states that baby will actively feed for about 20 min then pt will have to stimulate baby for 10 more min to try to get him to continue him feeding.    Pt's breast are full, nipples everted and intact, she states baby latches well. Oral assessment on baby shows upper lip restriction and posterior tongue restriction. Pt states no real pain with feeding. Pt was able to latch baby to right breast easily, baby did not flange upper lip out and when LC tried to fix lip baby would unlatch. This happened several times and each time baby would unlatch. Baby fed on right breast for 14 min and transferred 86cc. Baby burped and then had the hiccups and pt waited until these subside then latch baby to left breast, she states this side will sometimes be compressed on the nipple end after feeding. Baby latched and fed for 6 min and transferred 16cc more. Total transfer of 102cc in 20 min.     LC encouraged pt to not work with baby at breast for 30 min each side, once pt notices that baby is not longer actively feeding she should unlatch and burp and then switch sides. Letting baby eat as frequently as he wants. If she feels that during night time feeding baby is still hungry after feeding at breast she can supplement with EBM as needed.     Pt to call LC as needed.

## 2024-05-13 NOTE — OUTREACH NOTE
Maternal Screening Survey      Flowsheet Row Responses   Facility patient discharged from? [facility]   Attempt successful? No   Unsuccessful attempts Attempt 1  [left msg on vm]              LEOLA ALEMAN - Registered Nurse

## 2024-05-13 NOTE — OUTREACH NOTE
Maternal Screening Survey      Flowsheet Row Responses   Facility patient discharged from? [facility]   Attempt successful? Yes   Call start time    Call end time    EPD Scale: Able to Laugh 0-->as much as she always could   EPD Scale: Looked Forward 0-->as much as she ever did   EPD Scale: Blamed Self 0-->no, never   EPD Scale: Been Anxious 2-->yes, sometimes   EPD Scale: Felt Panicky 0-->no, not at all   EPD Scale: Things Getting on Top 1-->no, most of the time has coped quite well   EPD Scale: Difficulty Sleeping 0-->no, not at all   EPD Scale: Sad or Miserable 0-->no, not at all   EPD Scale: Crying 0-->no, never   EPD Scale: Thought of Harming Self 0-->never   [place]  Depression Score 3   Did any of your parents have problems with alcohol or drug use? No   Do any of your peers have problems with alcohol or drug use? No   Does your partner have problems with alcohol or drug use? No   Before you were pregnant did you have problems with alcohol or drug use? (past) No   In the past month, did you drink beer, wine, liquor or use any other drugs? (pregnancy) No   Maternal Screening call completed Yes   Call end time               LEOLA ALEMAN - Registered Nurse

## 2024-05-29 NOTE — PROGRESS NOTES
POSTPARTUM Follow Up Visit      Chief Complaint   Patient presents with    Postpartum Care     HPI:    Date of delivery: 2024  Delivery type:   LTCS  Delivering Provider:   Dr. Whitney Santamaria        Feeding: Breast  Pain:  yes, left side of incision with twisting or lifting has occasional tenderness.  Vaginal Bleeding:  no, occas spotting  Plans for BC:    may be getting vasectomy    Depressed/Anxious:  no  EPDS score: 0  #10: 0    Weight at last OB OV:  206lbs   Last pap date and result: IGP,CtNgTv,Apt HPV,rfx 16 / 18,45 (2023 09:23) unsatisfactory, due for Pap today    Progress Notes by Whitney Santamaria DO (2024 15:15)  Discharge Summary by Daniel Aguilera MD (2024 09:32)    Known history of chronic hypertension, currently on no antihypertensives    PHYSICAL EXAM:  /78   Wt 79.4 kg (175 lb)   Breastfeeding Yes   BMI 31.00 kg/m²  Not found.  General- NAD, alert and oriented, appropriate  Psych- Normal mood, good memory, good eye contact      Abdomen- Soft, non distended, non tender, no masses.  Left fascial knot palpable, mildly tender to deep palpation.  No rebound no guarding.     Chaperone present during pelvic exam.  External genitalia- Normal.    Urethra/Bladder/Vagina- Normal, no masses, non-tender  Prolapse : none noted, not examined with split speculum to delineate  Cervix- Normal, no lesions, no discharge, no CMT  Uterus- Normal size, shape & consistency.  Non tender, mobile.  Adnexa- Normal, no mass, non-tender    Lymphatic- No palpable groin nodes  Extremities- No edema    ASSESSMENT AND PLAN:  Diagnoses and all orders for this visit:    1. Postpartum follow-up (Primary)    2. Birth control counseling  Comments:  condoms until vas    3. Essential hypertension  Comments:  No meds, stable    4. ASCUS of cervix with negative high risk HPV  Overview:   ASCUS HPV neg  Aug 2023 HPV neg, pap unsatisfactory, pregnant.  Repeat pap PP.      Orders:  -     IgP, Aptima  HPV      Counseling:    May resume intercourse  May resume normal activities  Core strengthening exercises reviewed and recommended  Kegel exercises reviewed and recommended  Ok to return to work/school    Discussed today suspect pain on the left of incision is normal healing from surgery and the fascial knot.  Reassurance provided.        Follow Up:  Return in about 1 year (around 6/3/2025) for WWE.          Whitney Santamaria,   06/03/2024    [facility]  [facility] [place] [address]  Dept: [phone]  Dept Fax: [phone]  Loc: [phone]  Loc Fax: [phone]

## 2024-06-02 PROBLEM — I10 CHRONIC HYPERTENSION: Status: RESOLVED | Noted: 2023-09-29 | Resolved: 2024-06-02

## 2024-06-02 PROBLEM — Z34.80 SUPERVISION OF OTHER NORMAL PREGNANCY, ANTEPARTUM: Status: RESOLVED | Noted: 2023-08-30 | Resolved: 2024-06-02

## 2024-06-03 ENCOUNTER — POSTPARTUM VISIT (OUTPATIENT)
Dept: OBSTETRICS AND GYNECOLOGY | Facility: CLINIC | Age: 39
End: 2024-06-03
Payer: COMMERCIAL

## 2024-06-03 VITALS — DIASTOLIC BLOOD PRESSURE: 78 MMHG | BODY MASS INDEX: 31 KG/M2 | SYSTOLIC BLOOD PRESSURE: 126 MMHG | WEIGHT: 175 LBS

## 2024-06-03 DIAGNOSIS — I10 ESSENTIAL HYPERTENSION: ICD-10-CM

## 2024-06-03 DIAGNOSIS — Z30.09 BIRTH CONTROL COUNSELING: ICD-10-CM

## 2024-06-03 DIAGNOSIS — R87.610 ASCUS OF CERVIX WITH NEGATIVE HIGH RISK HPV: ICD-10-CM

## 2024-06-03 PROCEDURE — G0123 SCREEN CERV/VAG THIN LAYER: HCPCS | Performed by: OBSTETRICS & GYNECOLOGY

## 2024-06-03 PROCEDURE — 0503F POSTPARTUM CARE VISIT: CPT | Performed by: OBSTETRICS & GYNECOLOGY

## 2024-06-03 PROCEDURE — 87624 HPV HI-RISK TYP POOLED RSLT: CPT | Performed by: OBSTETRICS & GYNECOLOGY

## 2024-06-10 LAB
CYTOLOGIST CVX/VAG CYTO: NORMAL
CYTOLOGY CVX/VAG DOC CYTO: NORMAL
CYTOLOGY CVX/VAG DOC THIN PREP: NORMAL
DX ICD CODE: NORMAL
HPV I/H RISK 4 DNA CVX QL PROBE+SIG AMP: NEGATIVE
Lab: NORMAL
Lab: NORMAL
OTHER STN SPEC: NORMAL
RECOM F/U CVX/VAG CYTO: NORMAL
STAT OF ADQ CVX/VAG CYTO-IMP: NORMAL

## 2024-08-26 ENCOUNTER — TELEPHONE (OUTPATIENT)
Dept: FAMILY MEDICINE CLINIC | Facility: CLINIC | Age: 39
End: 2024-08-26
Payer: COMMERCIAL

## 2024-08-26 NOTE — TELEPHONE ENCOUNTER
Caller: Shelby Jon     Relationship: [unfilled]     Best call back number: [phone]    What is your medical concern? PAIN IN RIGHT BREAST     How long has this issue been going on? 32-48-HOURS    Is your provider already aware of this issue? NO     Have you been treated for this issue? NO, PATIENT IS UNSURE IF SHE NEEDS TO SEE HER PCP OR OB-GYN CONCERNING THIS ISSUE  SHE IS CURRENTLY BREAST FEEDING   APPOINTMENT IS SCHEDULED 08/29/2024 WITH JULIA  PLEASE CONTACT AND ADVISE IF SHE SHOULD KEEP APPOINTMENT OR SEE HER OB-GYN

## 2024-08-26 NOTE — TELEPHONE ENCOUNTER
It truly depends on her OB. My OB took care of all of my health concerns during and a period of time after pregnancy; other OB s do not. I would leave her appt with me and call her OB office to see their recommendations.

## 2025-05-02 ENCOUNTER — TELEMEDICINE (OUTPATIENT)
Dept: FAMILY MEDICINE CLINIC | Facility: TELEHEALTH | Age: 40
End: 2025-05-02
Payer: COMMERCIAL

## 2025-05-02 DIAGNOSIS — H10.9 BACTERIAL CONJUNCTIVITIS: Primary | ICD-10-CM

## 2025-05-02 RX ORDER — NEOMYCIN POLYMYXIN B SULFATES AND DEXAMETHASONE 3.5; 10000; 1 MG/ML; [USP'U]/ML; MG/ML
1 SUSPENSION/ DROPS OPHTHALMIC 4 TIMES DAILY
Qty: 5 ML | Refills: 0 | Status: SHIPPED | OUTPATIENT
Start: 2025-05-02 | End: 2025-05-09

## 2025-05-02 NOTE — PROGRESS NOTES
You have chosen to receive care through a telehealth visit.  Do you consent to use a video/audio connection for your medical care today? Yes     CHIEF COMPLAINT  Chief Complaint   Patient presents with    Eye Problem         HPI  Shelby Jon is a 39 y.o. female  presents with complaint of left eyelid swelling and redness with purulent drainage that started yesterday    Review of Systems   Eyes:  Positive for pain, discharge and redness.   All other systems reviewed and are negative.      Past Medical History:   Diagnosis Date    ASCUS of cervix with negative high risk HPV 2024    Chronic hypertension     H/O:  2024       Family History   Problem Relation Age of Onset    Diabetes Father     Breast cancer Mother         DCIS    Lung cancer Paternal Grandmother     Alcohol abuse Neg Hx     Arthritis Neg Hx     Asthma Neg Hx     Birth defects Neg Hx     Bleeding Disorder Neg Hx     Cancer Neg Hx     COPD Neg Hx     Depression Neg Hx     Drug abuse Neg Hx     Early death Neg Hx     Hearing loss Neg Hx     Heart disease Neg Hx     Hyperlipidemia Neg Hx     Hypertension Neg Hx     Kidney disease Neg Hx     Learning disabilities Neg Hx     Malig Hyperthermia Neg Hx     Mental illness Neg Hx     Mental retardation Neg Hx     Miscarriages / Stillbirths Neg Hx     Deep vein thrombosis Neg Hx     Pulmonary embolism Neg Hx     Uterine cancer Neg Hx     Ovarian cancer Neg Hx        Social History     Socioeconomic History    Marital status:    Tobacco Use    Smoking status: Never     Passive exposure: Never    Smokeless tobacco: Never   Vaping Use    Vaping status: Never Used   Substance and Sexual Activity    Alcohol use: No    Drug use: No    Sexual activity: Not Currently     Partners: Male     Birth control/protection: None       Shelby Jon  reports that she has never smoked. She has never been exposed to tobacco smoke. She has never used smokeless tobacco.          There were no vitals taken  for this visit.    PHYSICAL EXAM  Physical Exam   Constitutional: She appears well-developed and well-nourished.   HENT:   Head: Normocephalic.   Eyes: Pupils are equal, round, and reactive to light.   Left eyelid welling and redness   Pulmonary/Chest: Effort normal.   Musculoskeletal: Normal range of motion.   Neurological: She is alert.   Psychiatric: She has a normal mood and affect.       Results for orders placed or performed in visit on 06/03/24   IgP, Aptima HPV    Collection Time: 06/03/24 12:02 PM    Specimen: ThinPrep Vial   Result Value Ref Range    Diagnosis Comment     Recommendation: Comment     Specimen adequacy: Comment     Clinician Provided ICD-10: Comment     Performed by: Comment     QC reviewed by: Comment     . .     Note: Comment     Method: Comment     HPV Aptima Negative Negative       Diagnoses and all orders for this visit:    1. Bacterial conjunctivitis (Primary)  -     neomycin-polymyxin-dexamethasone (MAXITROL) 0.1 % ophthalmic suspension; Administer 1 drop into the left eye 4 (Four) Times a Day for 7 days.  Dispense: 5 mL; Refill: 0          FOLLOW-UP  As discussed during visit with PCP/[facility] if no improvement or Urgent Care/Emergency Department if worsening of symptoms    Patient verbalizes understanding of medication dosage, comfort measures, instructions for treatment and follow-up.    RADHA Reyna  05/02/2025  06:12 EDT    Mode of Visit: Video  Location of patient: -HOME-  Location of provider: +HOME+  You have chosen to receive care through a telehealth visit.  The patient has signed the video visit consent form.  The visit included audio and video interaction. No technical issues occurred during this visit.      The use of a video visit has been reviewed with the patient and verbal informed consent has been obtained. Myself and hSelby Jon participated in this visit. The patient is located in [address] [place] [address].   I am located in [place], [place].  [employer] and [employer] were utilized. I spent 10 minutes in the patient's chart for this visit.         Note Disclaimer: At [facility], we believe that sharing information builds trust and better   relationships. You are receiving this note because you recently visited [facility]. It is possible you   will see health information before a provider has talked with you about it. This kind of information can   be easy to misunderstand. To help you fully understand what it means for your health, we urge you to   discuss this note with your provider.

## (undated) DEVICE — SUT VICRYL 3/0 CT1 27IN J258H

## (undated) DEVICE — KT ART BLD GAS QUICK DRAW

## (undated) DEVICE — TRY CATH FOL ADVANCE SIL W/BAG 16F

## (undated) DEVICE — SUT GUT CHRM 0 CT 27IN 914H

## (undated) DEVICE — INTENDED FOR TISSUE SEPARATION, AND OTHER PROCEDURES THAT REQUIRE A SHARP SURGICAL BLADE TO PUNCTURE OR CUT.: Brand: BARD-PARKER ® CARBON RIB-BACK BLADES

## (undated) DEVICE — PROXIMATE RH ROTATING HEAD SKIN STAPLERS (35 WIDE) CONTAINS 35 STAINLESS STEEL STAPLES: Brand: PROXIMATE

## (undated) DEVICE — SUT MNCRYL 0/0 CTX 36IN Y398H

## (undated) DEVICE — SOL IRR H2O BTL 1000ML STRL

## (undated) DEVICE — STERILE POLYISOPRENE POWDER-FREE SURGICAL GLOVES WITH EMOLLIENT COATING: Brand: PROTEXIS

## (undated) DEVICE — GLV SURG BIOGEL LTX PF 6 1/2

## (undated) DEVICE — GOWN,REINF,POLY,SIRUS,BRTH SLV,XLNG/XXL: Brand: MEDLINE

## (undated) DEVICE — NDL BLNT 18G 1 1/2IN

## (undated) DEVICE — SUT MNCRYL PLS ANTIB UD 4/0 PS2 18IN

## (undated) DEVICE — NEEDLE,18GX1.5",REG,BEVEL: Brand: MEDLINE

## (undated) DEVICE — PAD GRND REM POLYHESIVE A/ DISP

## (undated) DEVICE — KENDALL SCD EXPRESS SLEEVES, KNEE LENGTH, MEDIUM: Brand: KENDALL SCD

## (undated) DEVICE — C SECTION PACK: Brand: MEDLINE INDUSTRIES, INC.

## (undated) DEVICE — SUT GUT CHRM 3/0 CT1 3/0 36IN 922H

## (undated) DEVICE — 3M(TM) TEGADERM(TM) TRANSPARENT FILM DRESSING FRAME STYLE 1627: Brand: 3M™ TEGADERM™

## (undated) DEVICE — SUT CHRM 0 CT1 36IN 924H

## (undated) DEVICE — DEV TRANSF BLD W/LUER ADPT CA/198

## (undated) DEVICE — SUT VIC 0 CTX 36IN J978H

## (undated) DEVICE — SUT MNCRYL 3/0 CT1 36 IN Y944H

## (undated) DEVICE — ADHS SKIN DERMABOND TOPICAL HI VISC

## (undated) DEVICE — VIOLET BRAIDED (POLYGLACTIN 910), SYNTHETIC ABSORBABLE SUTURE: Brand: COATED VICRYL

## (undated) DEVICE — Device: Brand: PORTEX

## (undated) DEVICE — SUT PDS 0 CTX 36IN DYED Z370T

## (undated) DEVICE — CVR HNDL LT SURG ACCSSRY BLU STRL